# Patient Record
Sex: FEMALE | Race: WHITE | NOT HISPANIC OR LATINO | URBAN - METROPOLITAN AREA
[De-identification: names, ages, dates, MRNs, and addresses within clinical notes are randomized per-mention and may not be internally consistent; named-entity substitution may affect disease eponyms.]

---

## 2017-04-10 ENCOUNTER — INPATIENT (INPATIENT)
Facility: HOSPITAL | Age: 27
LOS: 2 days | Discharge: HOME | End: 2017-04-13
Payer: COMMERCIAL

## 2017-04-10 VITALS — HEIGHT: 60 IN | WEIGHT: 158.73 LBS

## 2017-04-10 DIAGNOSIS — O47.1 FALSE LABOR AT OR AFTER 37 COMPLETED WEEKS OF GESTATION: ICD-10-CM

## 2017-04-10 LAB
APPEARANCE UR: CLEAR — SIGNIFICANT CHANGE UP
BASOPHILS # BLD AUTO: 0 K/UL — SIGNIFICANT CHANGE UP (ref 0–0.2)
BASOPHILS NFR BLD AUTO: 0.3 % — SIGNIFICANT CHANGE UP (ref 0–2)
BILIRUB UR-MCNC: NEGATIVE — SIGNIFICANT CHANGE UP
BLD GP AB SCN SERPL QL: SIGNIFICANT CHANGE UP
COLOR SPEC: SIGNIFICANT CHANGE UP
DIFF PNL FLD: NEGATIVE — SIGNIFICANT CHANGE UP
EOSINOPHIL # BLD AUTO: 0.3 K/UL — SIGNIFICANT CHANGE UP (ref 0–0.5)
EOSINOPHIL NFR BLD AUTO: 3.2 % — SIGNIFICANT CHANGE UP (ref 0–6)
GLUCOSE UR QL: NEGATIVE MG/DL — SIGNIFICANT CHANGE UP
HCT VFR BLD CALC: 33.9 % — LOW (ref 37–47)
HGB BLD-MCNC: 12 G/DL — SIGNIFICANT CHANGE UP (ref 12–16)
KETONES UR-MCNC: NEGATIVE — SIGNIFICANT CHANGE UP
LEUKOCYTE ESTERASE UR-ACNC: NEGATIVE — SIGNIFICANT CHANGE UP
LYMPHOCYTES # BLD AUTO: 2.7 K/UL — SIGNIFICANT CHANGE UP (ref 1–4.8)
LYMPHOCYTES # BLD AUTO: 27.8 % — SIGNIFICANT CHANGE UP (ref 20–55)
MCHC RBC-ENTMCNC: 31.6 PG — HIGH (ref 27–31)
MCHC RBC-ENTMCNC: 35.4 G/DL — SIGNIFICANT CHANGE UP (ref 32–36)
MCV RBC AUTO: 89.2 FL — SIGNIFICANT CHANGE UP (ref 81–99)
MONOCYTES # BLD AUTO: 1.1 K/UL — HIGH (ref 0–0.8)
MONOCYTES NFR BLD AUTO: 11.2 % — HIGH (ref 3–10)
NEUTROPHILS # BLD AUTO: 5.6 K/UL — SIGNIFICANT CHANGE UP (ref 1.8–8)
NEUTROPHILS NFR BLD AUTO: 56.5 % — SIGNIFICANT CHANGE UP (ref 37–73)
NITRITE UR-MCNC: NEGATIVE — SIGNIFICANT CHANGE UP
PH UR: 7 — SIGNIFICANT CHANGE UP (ref 4.8–8)
PLATELET # BLD AUTO: 223 K/UL — SIGNIFICANT CHANGE UP (ref 150–400)
PROT UR-MCNC: NEGATIVE MG/DL — SIGNIFICANT CHANGE UP
RBC # BLD: 3.8 M/UL — LOW (ref 4.4–5.2)
RBC # FLD: 13.7 % — SIGNIFICANT CHANGE UP (ref 11–15.6)
SP GR SPEC: 1 — LOW (ref 1.01–1.02)
TYPE + AB SCN PNL BLD: SIGNIFICANT CHANGE UP
UROBILINOGEN FLD QL: NEGATIVE MG/DL — SIGNIFICANT CHANGE UP
WBC # BLD: 9.9 K/UL — SIGNIFICANT CHANGE UP (ref 4.8–10.8)
WBC # FLD AUTO: 9.9 K/UL — SIGNIFICANT CHANGE UP (ref 4.8–10.8)

## 2017-04-10 RX ORDER — SODIUM CHLORIDE 9 MG/ML
1000 INJECTION, SOLUTION INTRAVENOUS
Qty: 0 | Refills: 0 | Status: DISCONTINUED | OUTPATIENT
Start: 2017-04-10 | End: 2017-04-11

## 2017-04-10 RX ORDER — CITRIC ACID/SODIUM CITRATE 300-500 MG
30 SOLUTION, ORAL ORAL ONCE
Qty: 0 | Refills: 0 | Status: COMPLETED | OUTPATIENT
Start: 2017-04-10 | End: 2017-04-10

## 2017-04-10 RX ORDER — OXYTOCIN 10 UNIT/ML
333.33 VIAL (ML) INJECTION
Qty: 20 | Refills: 0 | Status: COMPLETED | OUTPATIENT
Start: 2017-04-10

## 2017-04-10 RX ORDER — MEPERIDINE HYDROCHLORIDE 50 MG/ML
75 INJECTION INTRAMUSCULAR; INTRAVENOUS; SUBCUTANEOUS EVERY 4 HOURS
Qty: 0 | Refills: 0 | Status: DISCONTINUED | OUTPATIENT
Start: 2017-04-10 | End: 2017-04-11

## 2017-04-10 RX ORDER — SODIUM CHLORIDE 9 MG/ML
1500 INJECTION, SOLUTION INTRAVENOUS ONCE
Qty: 0 | Refills: 0 | Status: COMPLETED | OUTPATIENT
Start: 2017-04-10 | End: 2017-04-11

## 2017-04-10 RX ADMIN — SODIUM CHLORIDE 125 MILLILITER(S): 9 INJECTION, SOLUTION INTRAVENOUS at 22:40

## 2017-04-10 NOTE — PATIENT PROFILE OB - VISION (WITH CORRECTIVE LENSES IF THE PATIENT USUALLY WEARS THEM):
Normal vision: sees adequately in most situations; can see medication labels, newsprint Normal vision: sees adequately in most situations; can see medication labels, newsprint/n/a

## 2017-04-10 NOTE — PATIENT PROFILE OB - ABILITY TO HEAR (WITH HEARING AID OR HEARING APPLIANCE IF NORMALLY USED):
Adequate: hears normal conversation without difficulty Adequate: hears normal conversation without difficulty/n/a

## 2017-04-11 LAB
ABO RH CONFIRMATION: SIGNIFICANT CHANGE UP
BASE EXCESS BLDCOA CALC-SCNC: -11.3 MMOL/L — SIGNIFICANT CHANGE UP (ref -11.6–0.4)
BASE EXCESS BLDCOV CALC-SCNC: -9.5 MMOL/L — LOW (ref -9.3–0.3)
GAS PNL BLDCOV: 7.22 — SIGNIFICANT CHANGE UP (ref 7.11–7.36)
HCO3 BLDCOA-SCNC: 17 MMOL/L — SIGNIFICANT CHANGE UP (ref 15–27)
HCO3 BLDCOV-SCNC: 18 MMOL/L — SIGNIFICANT CHANGE UP (ref 17–25)
PCO2 BLDCOA: 51.8 MMHG — SIGNIFICANT CHANGE UP (ref 32.2–65.8)
PCO2 BLDCOV: 42.7 MMHG — SIGNIFICANT CHANGE UP (ref 27–49.4)
PH BLDCOA: 7.14 — SIGNIFICANT CHANGE UP (ref 7.11–7.36)
PO2 BLDCOA: 23.8 MMHG — SIGNIFICANT CHANGE UP (ref 6–30)
PO2 BLDCOA: 33 MMHG — SIGNIFICANT CHANGE UP (ref 17.4–41)
RPR SERPL-ACNC: SIGNIFICANT CHANGE UP
SAO2 % BLDCOA: SIGNIFICANT CHANGE UP
SAO2 % BLDCOV: SIGNIFICANT CHANGE UP

## 2017-04-11 RX ORDER — ACETAMINOPHEN 500 MG
650 TABLET ORAL EVERY 6 HOURS
Qty: 0 | Refills: 0 | Status: DISCONTINUED | OUTPATIENT
Start: 2017-04-11 | End: 2017-04-13

## 2017-04-11 RX ORDER — LANOLIN
1 OINTMENT (GRAM) TOPICAL EVERY 6 HOURS
Qty: 0 | Refills: 0 | Status: DISCONTINUED | OUTPATIENT
Start: 2017-04-11 | End: 2017-04-13

## 2017-04-11 RX ORDER — IBUPROFEN 200 MG
600 TABLET ORAL EVERY 6 HOURS
Qty: 0 | Refills: 0 | Status: DISCONTINUED | OUTPATIENT
Start: 2017-04-11 | End: 2017-04-13

## 2017-04-11 RX ORDER — OXYTOCIN 10 UNIT/ML
333.33 VIAL (ML) INJECTION
Qty: 20 | Refills: 0 | Status: COMPLETED | OUTPATIENT
Start: 2017-04-11 | End: 2017-04-11

## 2017-04-11 RX ORDER — OXYTOCIN 10 UNIT/ML
2 VIAL (ML) INJECTION
Qty: 30 | Refills: 0 | Status: DISCONTINUED | OUTPATIENT
Start: 2017-04-11 | End: 2017-04-13

## 2017-04-11 RX ORDER — SODIUM CHLORIDE 9 MG/ML
3 INJECTION INTRAMUSCULAR; INTRAVENOUS; SUBCUTANEOUS EVERY 8 HOURS
Qty: 0 | Refills: 0 | Status: DISCONTINUED | OUTPATIENT
Start: 2017-04-11 | End: 2017-04-13

## 2017-04-11 RX ORDER — OXYTOCIN 10 UNIT/ML
41.67 VIAL (ML) INJECTION
Qty: 20 | Refills: 0 | Status: DISCONTINUED | OUTPATIENT
Start: 2017-04-11 | End: 2017-04-13

## 2017-04-11 RX ORDER — TETANUS TOXOID, REDUCED DIPHTHERIA TOXOID AND ACELLULAR PERTUSSIS VACCINE, ADSORBED 5; 2.5; 8; 8; 2.5 [IU]/.5ML; [IU]/.5ML; UG/.5ML; UG/.5ML; UG/.5ML
0.5 SUSPENSION INTRAMUSCULAR ONCE
Qty: 0 | Refills: 0 | Status: COMPLETED | OUTPATIENT
Start: 2017-04-11 | End: 2018-03-10

## 2017-04-11 RX ORDER — DIBUCAINE 1 %
1 OINTMENT (GRAM) RECTAL EVERY 4 HOURS
Qty: 0 | Refills: 0 | Status: DISCONTINUED | OUTPATIENT
Start: 2017-04-11 | End: 2017-04-13

## 2017-04-11 RX ORDER — AER TRAVELER 0.5 G/1
1 SOLUTION RECTAL; TOPICAL EVERY 4 HOURS
Qty: 0 | Refills: 0 | Status: DISCONTINUED | OUTPATIENT
Start: 2017-04-11 | End: 2017-04-13

## 2017-04-11 RX ORDER — SIMETHICONE 80 MG/1
80 TABLET, CHEWABLE ORAL EVERY 6 HOURS
Qty: 0 | Refills: 0 | Status: DISCONTINUED | OUTPATIENT
Start: 2017-04-11 | End: 2017-04-13

## 2017-04-11 RX ORDER — DIPHENHYDRAMINE HCL 50 MG
25 CAPSULE ORAL EVERY 6 HOURS
Qty: 0 | Refills: 0 | Status: DISCONTINUED | OUTPATIENT
Start: 2017-04-11 | End: 2017-04-13

## 2017-04-11 RX ORDER — PRAMOXINE HYDROCHLORIDE 150 MG/15G
1 AEROSOL, FOAM RECTAL EVERY 4 HOURS
Qty: 0 | Refills: 0 | Status: DISCONTINUED | OUTPATIENT
Start: 2017-04-11 | End: 2017-04-13

## 2017-04-11 RX ORDER — DOCUSATE SODIUM 100 MG
100 CAPSULE ORAL
Qty: 0 | Refills: 0 | Status: DISCONTINUED | OUTPATIENT
Start: 2017-04-11 | End: 2017-04-13

## 2017-04-11 RX ORDER — HYDROCORTISONE 1 %
1 OINTMENT (GRAM) TOPICAL EVERY 4 HOURS
Qty: 0 | Refills: 0 | Status: DISCONTINUED | OUTPATIENT
Start: 2017-04-11 | End: 2017-04-13

## 2017-04-11 RX ADMIN — SODIUM CHLORIDE 3000 MILLILITER(S): 9 INJECTION, SOLUTION INTRAVENOUS at 08:00

## 2017-04-11 RX ADMIN — Medication 600 MILLIGRAM(S): at 20:25

## 2017-04-11 RX ADMIN — Medication 600 MILLIGRAM(S): at 21:25

## 2017-04-11 RX ADMIN — Medication 1000 MILLIUNIT(S)/MIN: at 16:44

## 2017-04-11 RX ADMIN — Medication 2 MILLIUNIT(S)/MIN: at 10:41

## 2017-04-11 RX ADMIN — SODIUM CHLORIDE 125 MILLILITER(S): 9 INJECTION, SOLUTION INTRAVENOUS at 05:10

## 2017-04-12 LAB
ANISOCYTOSIS BLD QL: SLIGHT — SIGNIFICANT CHANGE UP
BASOPHILS # BLD AUTO: 0 K/UL — SIGNIFICANT CHANGE UP (ref 0–0.2)
EOSINOPHIL # BLD AUTO: 0.4 K/UL — SIGNIFICANT CHANGE UP (ref 0–0.5)
EOSINOPHIL NFR BLD AUTO: 3 % — SIGNIFICANT CHANGE UP (ref 0–5)
HCT VFR BLD CALC: 29.4 % — LOW (ref 37–47)
HGB BLD-MCNC: 10.5 G/DL — LOW (ref 12–16)
LYMPHOCYTES # BLD AUTO: 17 % — LOW (ref 20–55)
LYMPHOCYTES # BLD AUTO: 2.5 K/UL — SIGNIFICANT CHANGE UP (ref 1–4.8)
MACROCYTES BLD QL: SLIGHT — SIGNIFICANT CHANGE UP
MCHC RBC-ENTMCNC: 32.4 PG — HIGH (ref 27–31)
MCHC RBC-ENTMCNC: 35.7 G/DL — SIGNIFICANT CHANGE UP (ref 32–36)
MCV RBC AUTO: 90.7 FL — SIGNIFICANT CHANGE UP (ref 81–99)
MICROCYTES BLD QL: SLIGHT — SIGNIFICANT CHANGE UP
MONOCYTES # BLD AUTO: 1.5 K/UL — HIGH (ref 0–0.8)
MONOCYTES NFR BLD AUTO: 10 % — SIGNIFICANT CHANGE UP (ref 3–10)
NEUTROPHILS # BLD AUTO: 1 K/UL — LOW (ref 1.8–8)
NEUTROPHILS NFR BLD AUTO: 67 % — SIGNIFICANT CHANGE UP (ref 37–73)
NEUTS BAND # BLD: 3 % — SIGNIFICANT CHANGE UP (ref 0–8)
PLAT MORPH BLD: NORMAL — SIGNIFICANT CHANGE UP
PLATELET # BLD AUTO: 189 K/UL — SIGNIFICANT CHANGE UP (ref 150–400)
POIKILOCYTOSIS BLD QL AUTO: SLIGHT — SIGNIFICANT CHANGE UP
POLYCHROMASIA BLD QL SMEAR: SLIGHT — SIGNIFICANT CHANGE UP
RBC # BLD: 3.24 M/UL — LOW (ref 4.4–5.2)
RBC # FLD: 13.8 % — SIGNIFICANT CHANGE UP (ref 11–15.6)
RBC BLD AUTO: ABNORMAL
WBC # BLD: 14.6 K/UL — HIGH (ref 4.8–10.8)
WBC # FLD AUTO: 14.6 K/UL — HIGH (ref 4.8–10.8)

## 2017-04-12 RX ADMIN — Medication 100 MILLIGRAM(S): at 17:02

## 2017-04-12 RX ADMIN — Medication 600 MILLIGRAM(S): at 04:05

## 2017-04-12 RX ADMIN — Medication 600 MILLIGRAM(S): at 15:12

## 2017-04-12 RX ADMIN — Medication 600 MILLIGRAM(S): at 09:12

## 2017-04-12 RX ADMIN — Medication 600 MILLIGRAM(S): at 03:05

## 2017-04-12 RX ADMIN — Medication 100 MILLIGRAM(S): at 06:10

## 2017-04-12 RX ADMIN — Medication 600 MILLIGRAM(S): at 16:15

## 2017-04-12 RX ADMIN — Medication 600 MILLIGRAM(S): at 10:20

## 2017-04-12 NOTE — PROGRESS NOTE ADULT - SUBJECTIVE AND OBJECTIVE BOX
S: Patient doing well, denies complaints.  OOB, +voiding, +ambulating without difficulty.  No dizziness.  Not breast feeding.    O: Vital Signs Last 24 Hrs  T(C): 36.9, Max: 36.9 (- @ 17:46)  T(F): 98.5, Max: 98.5 (04-11 @ 17:46)  HR: 87 (77 - 97)  BP: 110/66 (110/66 - 136/63)  BP(mean): --  RR: 13 (13 - 16)  SpO2: 95% (95% - 95%)    Gen: NAD  Abd: soft, NT, ND, fundus firm below umbilicus  Lochia min/mod  Ext: no DVT tenderness    Labs:                        12.0   9.9   )-----------( 223      ( 10 Apr 2017 22:41 )             33.9        A: 26y PPD#1 s/p  doing well.  Will cont Motrin/Percocet prn.  Plan for D/C tomorrow am.

## 2017-04-13 VITALS
TEMPERATURE: 98 F | DIASTOLIC BLOOD PRESSURE: 66 MMHG | RESPIRATION RATE: 18 BRPM | HEART RATE: 80 BPM | SYSTOLIC BLOOD PRESSURE: 107 MMHG

## 2017-04-13 PROCEDURE — 59050 FETAL MONITOR W/REPORT: CPT

## 2017-04-13 PROCEDURE — 86901 BLOOD TYPING SEROLOGIC RH(D): CPT

## 2017-04-13 PROCEDURE — 86592 SYPHILIS TEST NON-TREP QUAL: CPT

## 2017-04-13 PROCEDURE — 82803 BLOOD GASES ANY COMBINATION: CPT

## 2017-04-13 PROCEDURE — 86850 RBC ANTIBODY SCREEN: CPT

## 2017-04-13 PROCEDURE — 81003 URINALYSIS AUTO W/O SCOPE: CPT

## 2017-04-13 PROCEDURE — 86900 BLOOD TYPING SEROLOGIC ABO: CPT

## 2017-04-13 PROCEDURE — 90707 MMR VACCINE SC: CPT

## 2017-04-13 PROCEDURE — 90715 TDAP VACCINE 7 YRS/> IM: CPT

## 2017-04-13 PROCEDURE — 85027 COMPLETE CBC AUTOMATED: CPT

## 2017-04-13 PROCEDURE — 36415 COLL VENOUS BLD VENIPUNCTURE: CPT

## 2017-04-13 RX ORDER — TETANUS TOXOID, REDUCED DIPHTHERIA TOXOID AND ACELLULAR PERTUSSIS VACCINE, ADSORBED 5; 2.5; 8; 8; 2.5 [IU]/.5ML; [IU]/.5ML; UG/.5ML; UG/.5ML; UG/.5ML
0.5 SUSPENSION INTRAMUSCULAR ONCE
Qty: 0 | Refills: 0 | Status: COMPLETED | OUTPATIENT
Start: 2017-04-13 | End: 2017-04-13

## 2017-04-13 RX ADMIN — Medication 100 MILLIGRAM(S): at 05:46

## 2017-04-13 RX ADMIN — Medication 0.5 MILLILITER(S): at 12:12

## 2017-04-13 RX ADMIN — TETANUS TOXOID, REDUCED DIPHTHERIA TOXOID AND ACELLULAR PERTUSSIS VACCINE, ADSORBED 0.5 MILLILITER(S): 5; 2.5; 8; 8; 2.5 SUSPENSION INTRAMUSCULAR at 05:46

## 2017-04-13 NOTE — DISCHARGE NOTE OB - CARE PROVIDER_API CALL
Bridgette Oliver), Obstetrics and Gynecology  [address] [place] [address]  Phone: [phone]  Fax: [phone]

## 2017-04-13 NOTE — PROGRESS NOTE ADULT - SUBJECTIVE AND OBJECTIVE BOX
Post partum Day#2        Vital Signs Last 24 Hrs  T(C): 36.8, Max: 36.8 (04-13 @ 08:47)  T(F): 98.3, Max: 98.3 (04-13 @ 08:47)  HR: 80 (69 - 80)  BP: 107/66 (106/68 - 107/66)  BP(mean): --  RR: 18 (18 - 18)  SpO2: --    Out of bed  Breasts soft  nontender no engorgement  Lungs Clear  Abdomen soft, fundus firm, lochia wnl  No cva or calf tenderness                          10.5   14.6  )-----------( 189      ( 12 Apr 2017 07:27 )             29.4               Patient doing well, discharge today to office in 6 weeks

## 2017-04-13 NOTE — DISCHARGE NOTE OB - CARE PLAN
Principal Discharge DX:	Vaginal delivery  Goal:	full recovery  Instructions for follow-up, activity and diet:	F/U in office in 6 wks, regular diet, pelvic rest

## 2017-04-13 NOTE — DISCHARGE NOTE OB - PATIENT PORTAL LINK FT
“You can access the FollowHealth Patient Portal, offered by [facility], by registering with the following website: [email or web address]”

## 2018-07-18 ENCOUNTER — RESULT REVIEW (OUTPATIENT)
Age: 28
End: 2018-07-18

## 2019-05-20 ENCOUNTER — EMERGENCY (EMERGENCY)
Facility: HOSPITAL | Age: 29
LOS: 1 days | Discharge: HOME | End: 2019-05-20
Attending: EMERGENCY MEDICINE
Payer: COMMERCIAL

## 2019-05-20 VITALS
HEIGHT: 60 IN | HEART RATE: 92 BPM | TEMPERATURE: 98 F | OXYGEN SATURATION: 98 % | SYSTOLIC BLOOD PRESSURE: 131 MMHG | WEIGHT: 113.1 LBS | DIASTOLIC BLOOD PRESSURE: 81 MMHG | RESPIRATION RATE: 20 BRPM

## 2019-05-20 LAB
ALBUMIN SERPL ELPH-MCNC: 4.6 G/DL — SIGNIFICANT CHANGE UP (ref 3.3–5.2)
ALP SERPL-CCNC: 71 U/L — SIGNIFICANT CHANGE UP (ref 40–120)
ALT FLD-CCNC: 11 U/L — SIGNIFICANT CHANGE UP
ANION GAP SERPL CALC-SCNC: 14 MMOL/L — SIGNIFICANT CHANGE UP (ref 5–17)
AST SERPL-CCNC: 18 U/L — SIGNIFICANT CHANGE UP
BILIRUB SERPL-MCNC: 0.7 MG/DL — SIGNIFICANT CHANGE UP (ref 0.4–2)
BUN SERPL-MCNC: 4 MG/DL — LOW (ref 8–20)
CALCIUM SERPL-MCNC: 9.8 MG/DL — SIGNIFICANT CHANGE UP (ref 8.6–10.2)
CHLORIDE SERPL-SCNC: 101 MMOL/L — SIGNIFICANT CHANGE UP (ref 98–107)
CO2 SERPL-SCNC: 23 MMOL/L — SIGNIFICANT CHANGE UP (ref 22–29)
CREAT SERPL-MCNC: 0.7 MG/DL — SIGNIFICANT CHANGE UP (ref 0.5–1.3)
D DIMER BLD IA.RAPID-MCNC: <150 NG/ML DDU — SIGNIFICANT CHANGE UP
GLUCOSE SERPL-MCNC: 111 MG/DL — SIGNIFICANT CHANGE UP (ref 70–115)
HCG SERPL-ACNC: <4 MIU/ML — SIGNIFICANT CHANGE UP
HCT VFR BLD CALC: 42.7 % — SIGNIFICANT CHANGE UP (ref 37–47)
HGB BLD-MCNC: 15 G/DL — SIGNIFICANT CHANGE UP (ref 12–16)
MAGNESIUM SERPL-MCNC: 1.5 MG/DL — LOW (ref 1.6–2.6)
MCHC RBC-ENTMCNC: 31.4 PG — HIGH (ref 27–31)
MCHC RBC-ENTMCNC: 35.1 G/DL — SIGNIFICANT CHANGE UP (ref 32–36)
MCV RBC AUTO: 89.5 FL — SIGNIFICANT CHANGE UP (ref 81–99)
PLATELET # BLD AUTO: 344 K/UL — SIGNIFICANT CHANGE UP (ref 150–400)
POTASSIUM SERPL-MCNC: 3.2 MMOL/L — LOW (ref 3.5–5.3)
POTASSIUM SERPL-SCNC: 3.2 MMOL/L — LOW (ref 3.5–5.3)
PROT SERPL-MCNC: 7.2 G/DL — SIGNIFICANT CHANGE UP (ref 6.6–8.7)
RBC # BLD: 4.77 M/UL — SIGNIFICANT CHANGE UP (ref 4.4–5.2)
RBC # FLD: 12.9 % — SIGNIFICANT CHANGE UP (ref 11–15.6)
SODIUM SERPL-SCNC: 138 MMOL/L — SIGNIFICANT CHANGE UP (ref 135–145)
T4 AB SER-ACNC: 6.2 UG/DL — SIGNIFICANT CHANGE UP (ref 4.5–12)
TROPONIN T SERPL-MCNC: <0.01 NG/ML — SIGNIFICANT CHANGE UP (ref 0–0.06)
TSH SERPL-MCNC: 2.24 UIU/ML — SIGNIFICANT CHANGE UP (ref 0.27–4.2)
WBC # BLD: 6.4 K/UL — SIGNIFICANT CHANGE UP (ref 4.8–10.8)
WBC # FLD AUTO: 6.4 K/UL — SIGNIFICANT CHANGE UP (ref 4.8–10.8)

## 2019-05-20 PROCEDURE — 84702 CHORIONIC GONADOTROPIN TEST: CPT

## 2019-05-20 PROCEDURE — 83735 ASSAY OF MAGNESIUM: CPT

## 2019-05-20 PROCEDURE — 96374 THER/PROPH/DIAG INJ IV PUSH: CPT

## 2019-05-20 PROCEDURE — 71046 X-RAY EXAM CHEST 2 VIEWS: CPT | Mod: 26

## 2019-05-20 PROCEDURE — 84484 ASSAY OF TROPONIN QUANT: CPT

## 2019-05-20 PROCEDURE — 80053 COMPREHEN METABOLIC PANEL: CPT

## 2019-05-20 PROCEDURE — 99285 EMERGENCY DEPT VISIT HI MDM: CPT

## 2019-05-20 PROCEDURE — 85379 FIBRIN DEGRADATION QUANT: CPT

## 2019-05-20 PROCEDURE — 36415 COLL VENOUS BLD VENIPUNCTURE: CPT

## 2019-05-20 PROCEDURE — 85027 COMPLETE CBC AUTOMATED: CPT

## 2019-05-20 PROCEDURE — 93010 ELECTROCARDIOGRAM REPORT: CPT

## 2019-05-20 PROCEDURE — 84443 ASSAY THYROID STIM HORMONE: CPT

## 2019-05-20 PROCEDURE — 71046 X-RAY EXAM CHEST 2 VIEWS: CPT

## 2019-05-20 PROCEDURE — 93005 ELECTROCARDIOGRAM TRACING: CPT

## 2019-05-20 PROCEDURE — 84436 ASSAY OF TOTAL THYROXINE: CPT

## 2019-05-20 PROCEDURE — 99285 EMERGENCY DEPT VISIT HI MDM: CPT | Mod: 25

## 2019-05-20 RX ORDER — ALPRAZOLAM 0.25 MG
0.25 TABLET ORAL ONCE
Refills: 0 | Status: DISCONTINUED | OUTPATIENT
Start: 2019-05-20 | End: 2019-05-20

## 2019-05-20 RX ORDER — MAGNESIUM SULFATE 500 MG/ML
1 VIAL (ML) INJECTION ONCE
Refills: 0 | Status: COMPLETED | OUTPATIENT
Start: 2019-05-20 | End: 2019-05-20

## 2019-05-20 RX ORDER — FLUTICASONE PROPIONATE AND SALMETEROL 50; 250 UG/1; UG/1
0 POWDER ORAL; RESPIRATORY (INHALATION)
Qty: 0 | Refills: 0 | DISCHARGE

## 2019-05-20 RX ORDER — SODIUM CHLORIDE 9 MG/ML
1000 INJECTION INTRAMUSCULAR; INTRAVENOUS; SUBCUTANEOUS ONCE
Refills: 0 | Status: COMPLETED | OUTPATIENT
Start: 2019-05-20 | End: 2019-05-20

## 2019-05-20 RX ORDER — POTASSIUM CHLORIDE 20 MEQ
40 PACKET (EA) ORAL ONCE
Refills: 0 | Status: COMPLETED | OUTPATIENT
Start: 2019-05-20 | End: 2019-05-20

## 2019-05-20 RX ORDER — ALBUTEROL 90 UG/1
0 AEROSOL, METERED ORAL
Qty: 0 | Refills: 0 | DISCHARGE

## 2019-05-20 RX ADMIN — Medication 0.25 MILLIGRAM(S): at 13:31

## 2019-05-20 RX ADMIN — Medication 40 MILLIEQUIVALENT(S): at 15:45

## 2019-05-20 RX ADMIN — SODIUM CHLORIDE 1000 MILLILITER(S): 9 INJECTION INTRAMUSCULAR; INTRAVENOUS; SUBCUTANEOUS at 15:48

## 2019-05-20 RX ADMIN — Medication 100 GRAM(S): at 15:45

## 2019-05-20 NOTE — ED PROVIDER NOTE - CARE PLAN
Principal Discharge DX:	Electrolyte disturbance  Secondary Diagnosis:	Anxiety Principal Discharge DX:	Panic attack  Secondary Diagnosis:	Anxiety  Secondary Diagnosis:	Hypokalemia  Secondary Diagnosis:	Hypomagnesemia

## 2019-05-20 NOTE — ED ADULT TRIAGE NOTE - CHIEF COMPLAINT QUOTE
pt c/o tingling to B/l hands with tightening. Pt states to have felt onset while driving to work this morning with SOB and hyperventilation. Pt aox4, resp even unlabored, report to feel tingling to B/L hands at this time. no PMH, or new medication. Pt states to be getting  in a few weeks.

## 2019-05-20 NOTE — ED PROVIDER NOTE - PHYSICAL EXAMINATION
+tachypneic, +hyperventilating, + anxious, +restless, +stiffness noted to BL hands. fingers, pt has full ROM in all extremities, strength 5/5 and  equal in BL upper and lower extremities

## 2019-05-20 NOTE — ED PROVIDER NOTE - CLINICAL SUMMARY MEDICAL DECISION MAKING FREE TEXT BOX
28 y/o F with hx bulimia presents with numbness and tingling to hands and legs accompanied by chest pain and SOB, EKG sinus arrythmia, d-dimer and troponin negative, hypokalemia and hypomagnesia noted, potassium and magnesium supplemented, pt reports improvement, d/c home with follow up with PCP

## 2019-05-20 NOTE — ED PROVIDER NOTE - OBJECTIVE STATEMENT
28 y/o female presents to the ED with c/o numbness and tinglings to hands and feet accompanied by chest pain/ tightness and SOB. Pt states she noticed tingling to her arms last night which improved on its own and then began again while driving today.  Patient reports in the ambulance tingling and tightness was at its worst and now reports her fingers are stiff. Pt is extremely anxious and hyperventilating. When asked patient can open and close hands and has full ROM but while sitting in stretcher her fingers are flexed and stiff. Denies headache and dizziness. Reports being under stress as she is planning her wedding.  She states she has not eaten anything yet today and admits to being bulimic since college but does not want her parents to know who are at bedside.

## 2019-05-20 NOTE — ED PROVIDER NOTE - NS ED ROS FT
+ chest tightness, +tingling and tightness to BL hands and legs, +stiffness to BL hands/ fingers , +SOB,

## 2019-05-20 NOTE — ED ADULT NURSE NOTE - OBJECTIVE STATEMENT
Patient AOX4, reliable historian, reports muscle cramping in hands and legs that started last night and increased in severity during the day today. Patient reports feeling very anxious and "overall not well". Patient reports getting  in 3 weeks and feels overwhelmed. Reports dizziness as well. Denies chest pain, n/v, fevers, chills, cough.

## 2019-05-20 NOTE — ED PROVIDER NOTE - ATTENDING CONTRIBUTION TO CARE
I, Yomi Oliver, performed a face to face bedside interview with this patient regarding history of present illness, review of symptoms and relevant past medical, social and family history.  I completed an independent physical examination. I have communicated the patient’s plan of care and disposition with the ACP.  29 year old female presents with SOB. Pt states that she was driving today when she immediately began to feel very anxious, her heart was racing, and she felt like she could not catch her breath. She states she was breathing very fast and afterwards her hands started to cramp. Upon initial eval, pt found to be hyperventilating, palced on non-rebreather, and it shortly abated  Gen: NAD, well appearing  CV: RRR  Pul: CTA b/l  Abd: Soft, non-distended, non-tender  Neuro: no focal deficits  Pt improved, states she feels better, neuro inatct, liekly panic attack, stable for dc

## 2019-05-20 NOTE — ED PROVIDER NOTE - PROGRESS NOTE DETAILS
Patient resting comfortably, eating crackers and drinking water. Reports feeling better but still with some tingling to BL hands and lower extremities. Fiance at bedside. Discussed with patient importance of balanced diet, hydration, and to stop using laxatives. Social work and psychiatry consults offered to patient but refused. Patient reports that she has a therapist outpatient and will follow up with her. Patient agrees to discharge plan

## 2019-05-21 PROBLEM — J45.909 UNSPECIFIED ASTHMA, UNCOMPLICATED: Chronic | Status: ACTIVE | Noted: 2019-05-20

## 2019-05-21 PROBLEM — Z00.00 ENCOUNTER FOR PREVENTIVE HEALTH EXAMINATION: Status: ACTIVE | Noted: 2019-05-21

## 2019-05-21 PROBLEM — F50.2 BULIMIA NERVOSA: Chronic | Status: ACTIVE | Noted: 2019-05-20

## 2019-06-04 ENCOUNTER — APPOINTMENT (OUTPATIENT)
Dept: FAMILY MEDICINE | Facility: CLINIC | Age: 29
End: 2019-06-04

## 2019-12-11 ENCOUNTER — RESULT REVIEW (OUTPATIENT)
Age: 29
End: 2019-12-11

## 2021-01-06 ENCOUNTER — RESULT REVIEW (OUTPATIENT)
Age: 31
End: 2021-01-06

## 2021-09-19 ENCOUNTER — EMERGENCY (EMERGENCY)
Facility: HOSPITAL | Age: 31
LOS: 1 days | Discharge: HOME | End: 2021-09-19
Attending: EMERGENCY MEDICINE
Payer: COMMERCIAL

## 2021-09-19 VITALS
DIASTOLIC BLOOD PRESSURE: 87 MMHG | OXYGEN SATURATION: 97 % | SYSTOLIC BLOOD PRESSURE: 129 MMHG | WEIGHT: 110.01 LBS | RESPIRATION RATE: 18 BRPM | TEMPERATURE: 99 F | HEIGHT: 60 IN | HEART RATE: 106 BPM

## 2021-09-19 VITALS — HEART RATE: 82 BPM

## 2021-09-19 PROCEDURE — 90471 IMMUNIZATION ADMIN: CPT

## 2021-09-19 PROCEDURE — 90715 TDAP VACCINE 7 YRS/> IM: CPT

## 2021-09-19 PROCEDURE — 73130 X-RAY EXAM OF HAND: CPT

## 2021-09-19 PROCEDURE — 12032 INTMD RPR S/A/T/EXT 2.6-7.5: CPT

## 2021-09-19 PROCEDURE — 99283 EMERGENCY DEPT VISIT LOW MDM: CPT | Mod: 25

## 2021-09-19 PROCEDURE — 12004 RPR S/N/AX/GEN/TRK7.6-12.5CM: CPT

## 2021-09-19 PROCEDURE — 73130 X-RAY EXAM OF HAND: CPT | Mod: 26,LT

## 2021-09-19 PROCEDURE — 99284 EMERGENCY DEPT VISIT MOD MDM: CPT | Mod: 25

## 2021-09-19 RX ORDER — ACETAMINOPHEN 500 MG
650 TABLET ORAL ONCE
Refills: 0 | Status: COMPLETED | OUTPATIENT
Start: 2021-09-19 | End: 2021-09-19

## 2021-09-19 RX ORDER — SACCHAROMYCES BOULARDII 250 MG
1 POWDER IN PACKET (EA) ORAL
Qty: 28 | Refills: 0
Start: 2021-09-19 | End: 2021-10-02

## 2021-09-19 RX ORDER — TETANUS TOXOID, REDUCED DIPHTHERIA TOXOID AND ACELLULAR PERTUSSIS VACCINE, ADSORBED 5; 2.5; 8; 8; 2.5 [IU]/.5ML; [IU]/.5ML; UG/.5ML; UG/.5ML; UG/.5ML
0.5 SUSPENSION INTRAMUSCULAR ONCE
Refills: 0 | Status: COMPLETED | OUTPATIENT
Start: 2021-09-19 | End: 2021-09-19

## 2021-09-19 RX ADMIN — TETANUS TOXOID, REDUCED DIPHTHERIA TOXOID AND ACELLULAR PERTUSSIS VACCINE, ADSORBED 0.5 MILLILITER(S): 5; 2.5; 8; 8; 2.5 SUSPENSION INTRAMUSCULAR at 20:31

## 2021-09-19 RX ADMIN — Medication 650 MILLIGRAM(S): at 20:31

## 2021-09-19 RX ADMIN — Medication 1 TABLET(S): at 20:31

## 2021-09-19 NOTE — ED PROVIDER NOTE - PROGRESS NOTE DETAILS
HUY Lew NOTE: Pt evaluated at bedside. 32 y/o F with no PMHx presents with left wrist and hand laceration occurring today while trying to break up dogs, reports dog is UTD on vaccinations. Pt last known TDAP > 10 years ago. Denies numbness, tingling.     PE: 3 cm gaping laceration to mid left ventral wrist without active bleeding; 5 cm curved jagged gaping laceration to left hypothenar eminence without active bleeding. FROM, strength intact, sensation intact, cap refill intact    Will follow up plan per physician. HUY Lew NOTE: Pt evaluated at bedside. 32 y/o F with no PMHx presents with left wrist and hand laceration occurring today while trying to break up dogs, reports dog is UTD on vaccinations. Pt last known TDAP > 10 years ago. Denies numbness, tingling.     PE: 3 cm gaping laceration to mid left ventral wrist without active bleeding; 5 cm curved jagged gaping laceration with multiple flaps to left hypothenar eminence without active bleeding, multiple abrasions. FROM, strength intact, sensation intact, cap refill intact    Will follow up plan per physician. HUY Lew NOTE: Xray reviewed, lac loosely approximated, f/u 2 days for wound check    Discussion includes results, plan, proper medication use/side effects, and return precautions. Pt advised to f/u with PMD 1-2 days and specialists discussed. Pt verbalized understanding/agreement of plan.

## 2021-09-19 NOTE — ED PROVIDER NOTE - CLINICAL SUMMARY MEDICAL DECISION MAKING FREE TEXT BOX
32 y/o F with no PMHx presents with left wrist and hand laceration occurring today while trying to break up dogs, reports dog is UTD on vaccinations.  -Will update TDAP, loosely approximate gaping lacerations, abx, bite form faxed to [facility]

## 2021-09-19 NOTE — ED PROCEDURE NOTE - CPROC ED LACER REPAIR DETAIL1
The wound was explored to base in bloodless field./No foreign body/Multiple flaps were aligned.
The wound was explored to base in bloodless field./No foreign body/Multiple flaps were aligned.

## 2021-09-19 NOTE — ED PROVIDER NOTE - PHYSICAL EXAMINATION
Skin: 2.5cm linear laceration to left wrist, 4cm curved laceration to left hand. no foreign body visualized in lacerations.

## 2021-09-19 NOTE — ED PROCEDURE NOTE - CPROC ED TIME OUT STATEMENT1
“Patient's name, , procedure and correct site were confirmed during the [place] Timeout.”
“Patient's name, , procedure and correct site were confirmed during the [place] Timeout.”

## 2021-09-19 NOTE — ED PROVIDER NOTE - ATTENDING CONTRIBUTION TO CARE
Pt. awake and alert. Pt. with laceration to left hand and left proximal forearm. Sensation intact. I, Dr. Peterson, performed a face to face bedside interview with this patient regarding history of present illness, review of symptoms and relevant past medical, social and family history.  I completed an independent physical examination.  I have also reviewed the ACP's note(s) and discussed the plan with the ACP.

## 2021-09-19 NOTE — ED PROVIDER NOTE - OBJECTIVE STATEMENT
Pt is a 31y Female with no significant PMHx who presents to ED today c/o dog bite to left hand and wrist this afternoon. Pt states she was breaking up a fight between her dog and boyfriend's dog when boyfriend's dog bit her. Pt states boyfriend's dog is up to date on vaccines. Denies fever, chills, other injuries, loss of sensation, numbness/tingling, loss of motor function.

## 2021-09-19 NOTE — ED PROVIDER NOTE - PATIENT PORTAL LINK FT
You can access the FollowMyHealth Patient Portal offered by [facility] by registering at the following website: [email or web address]. By joining [employer]’s FollowMyHealth portal, you will also be able to view your health information using other applications (apps) compatible with our system.

## 2021-09-19 NOTE — ED PROVIDER NOTE - NSFOLLOWUPINSTRUCTIONS_ED_ALL_ED_FT
- Please follow up with your Primary Care Doctor in 1 - 2 days. If you cannot follow-up with your primary care doctor please return to the Emergency Department for any urgent issues.  - Seek immediate medical care for any new, worsening or concerning signs or symptoms.   - Take medications as directed, be sure to read all instructions on packaging, Be sure to complete entire course of antibiotics as directed   - If you have difficulty following up, please call: [phone] or go to [email or web address] to obtain a [facility] doctor or specialist who takes your insurance in your area.    Feel better!       Animal Bite, Adult      Animal bite wounds can be mild or serious. It is important to get medical treatment to prevent infection. Ask your doctor if you need treatment to prevent an infection that can spread from animals to humans (rabies).      Follow these instructions at home:      Wound care    •Follow instructions from your doctor about how to take care of your wound. Make sure you:  •Wash your hands with soap and water before you change your bandage (dressing). If you cannot use soap and water, use hand .      •Change your bandage as told by your doctor.      •Leave stitches (sutures), skin glue, or skin tape (adhesive) strips in place. They may need to stay in place for 2 weeks or longer. If tape strips get loose and curl up, you may trim the loose edges. Do not remove tape strips completely unless your doctor says it is okay.      •Check your wound every day for signs of infection. Check for:  •More redness, swelling, or pain.      •More fluid or blood.      •Warmth.      •Pus or a bad smell.        Medicines     •Take or apply over-the-counter and prescription medicines only as told by your doctor.      •If you were prescribed an antibiotic, take or apply it as told by your doctor. Do not stop using the antibiotic even if your wound gets better.        General instructions      •Keep the injured area raised (elevated) above the level of your heart while you are sitting or lying down.    •If directed, put ice on the injured area.  •Put ice in a plastic bag.      •Place a towel between your skin and the bag.      •Leave the ice on for 20 minutes, 2–3 times per day.        •Keep all follow-up visits as told by your doctor. This is important.        Contact a doctor if:    •You have more redness, swelling, or pain around your wound.      •Your wound feels warm to the touch.      •You have a fever or chills.      •You have a general feeling of sickness (malaise).      •You feel sick to your stomach (nauseous).      •You throw up (vomit).      •You have pain that does not get better.        Get help right away if:    •You have a red streak going away from your wound.    •You have any of these coming from your wound:  •Non-clear fluid.      •More blood.      •Pus or a bad smell.        •You have trouble moving your injured area.      •You lose feeling (have numbness) or feel tingling anywhere on your body.        Summary    •It is important to get the right medical treatment for animal bites. Treatment can help you to not get an infection. Ask your doctor if you need treatment to prevent an infection that can spread from animals to humans (rabies).      •Check your wound every day for signs of infection, such as more redness or swelling instead of less.      •If you have a red streak going away from your wound, get medical help right away.      This information is not intended to replace advice given to you by your health care provider. Make sure you discuss any questions you have with your health care provider.

## 2022-01-07 ENCOUNTER — RESULT REVIEW (OUTPATIENT)
Age: 32
End: 2022-01-07

## 2022-03-25 NOTE — ED PROVIDER NOTE - CROS ED RESP ALL NEG
Discharge Planning    Met with pt to review physical therapy's recommendation for SNF. Pt becomes irritated when SNF mentioned. She can not give a reason on why she does not want to go to SNF. She has been to [facility] and did not relate any bad experiences that she had there. She states she wants to go home with [facility] home care and that her granddaughter Katia Dee will help her too. Phone call to Katia Dee at [phone] and message left. Phone call to son Usha Riley and left message. Physical therapy has suggested a wheelchair and discussed with patient and she wants a wheelchair with leg rests and question whether pt will be able to safely manuver in the house in it. - - -

## 2022-05-16 NOTE — DISCHARGE NOTE OB - AVOID TUB BATHING UNTIL YOUR POSTPARTUM VISIT
1. Sleep with head elevated. 2. Cool compresses to face and eyes  3. Start oral steroid tomorrow  4. Take benadryl/zyrtec every 6 hours as needed for itching  5. Go to ER with SOB, throat welling, worsening swelling of face  6. Follow up UC as needed.
Statement Selected

## 2022-10-12 ENCOUNTER — INPATIENT (INPATIENT)
Facility: HOSPITAL | Age: 32
LOS: 0 days | Discharge: HOME | End: 2022-10-13
Payer: COMMERCIAL

## 2022-10-12 VITALS
RESPIRATION RATE: 16 BRPM | HEART RATE: 73 BPM | TEMPERATURE: 98 F | SYSTOLIC BLOOD PRESSURE: 109 MMHG | DIASTOLIC BLOOD PRESSURE: 76 MMHG

## 2022-10-12 LAB
ABO RH CONFIRMATION: SIGNIFICANT CHANGE UP
ALBUMIN SERPL ELPH-MCNC: 3.6 G/DL — SIGNIFICANT CHANGE UP (ref 3.3–5.2)
ALP SERPL-CCNC: 136 U/L — HIGH (ref 40–120)
ALT FLD-CCNC: 27 U/L — SIGNIFICANT CHANGE UP
ANION GAP SERPL CALC-SCNC: 12 MMOL/L — SIGNIFICANT CHANGE UP (ref 5–17)
APPEARANCE UR: CLEAR — SIGNIFICANT CHANGE UP
APTT BLD: 25.3 SEC — LOW (ref 27.5–35.5)
AST SERPL-CCNC: 27 U/L — SIGNIFICANT CHANGE UP
BACTERIA # UR AUTO: ABNORMAL
BASOPHILS # BLD AUTO: 0.04 K/UL — SIGNIFICANT CHANGE UP (ref 0–0.2)
BASOPHILS NFR BLD AUTO: 0.5 % — SIGNIFICANT CHANGE UP (ref 0–2)
BILIRUB SERPL-MCNC: 0.2 MG/DL — LOW (ref 0.4–2)
BILIRUB UR-MCNC: NEGATIVE — SIGNIFICANT CHANGE UP
BLD GP AB SCN SERPL QL: SIGNIFICANT CHANGE UP
BUN SERPL-MCNC: 7.7 MG/DL — LOW (ref 8–20)
CALCIUM SERPL-MCNC: 9.6 MG/DL — SIGNIFICANT CHANGE UP (ref 8.4–10.5)
CHLORIDE SERPL-SCNC: 101 MMOL/L — SIGNIFICANT CHANGE UP (ref 98–107)
CO2 SERPL-SCNC: 20 MMOL/L — LOW (ref 22–29)
COLOR SPEC: YELLOW — SIGNIFICANT CHANGE UP
CREAT ?TM UR-MCNC: 20 MG/DL — SIGNIFICANT CHANGE UP
CREAT SERPL-MCNC: 0.63 MG/DL — SIGNIFICANT CHANGE UP (ref 0.5–1.3)
DIFF PNL FLD: ABNORMAL
EGFR: 122 ML/MIN/1.73M2 — SIGNIFICANT CHANGE UP
EOSINOPHIL # BLD AUTO: 0.19 K/UL — SIGNIFICANT CHANGE UP (ref 0–0.5)
EOSINOPHIL NFR BLD AUTO: 2.4 % — SIGNIFICANT CHANGE UP (ref 0–6)
EPI CELLS # UR: SIGNIFICANT CHANGE UP
FIBRINOGEN PPP-MCNC: 622 MG/DL — HIGH (ref 330–520)
GLUCOSE SERPL-MCNC: 65 MG/DL — LOW (ref 70–99)
GLUCOSE UR QL: NEGATIVE MG/DL — SIGNIFICANT CHANGE UP
HCT VFR BLD CALC: 35.1 % — SIGNIFICANT CHANGE UP (ref 34.5–45)
HGB BLD-MCNC: 12.1 G/DL — SIGNIFICANT CHANGE UP (ref 11.5–15.5)
HIV 1 & 2 AB SERPL IA.RAPID: SIGNIFICANT CHANGE UP
HIV 1+2 AB+HIV1 P24 AG SERPL QL IA: SIGNIFICANT CHANGE UP
IMM GRANULOCYTES NFR BLD AUTO: 1.4 % — HIGH (ref 0–0.9)
INR BLD: 0.88 RATIO — SIGNIFICANT CHANGE UP (ref 0.88–1.16)
KETONES UR-MCNC: NEGATIVE — SIGNIFICANT CHANGE UP
LDH SERPL L TO P-CCNC: 237 U/L — HIGH (ref 98–192)
LEUKOCYTE ESTERASE UR-ACNC: ABNORMAL
LYMPHOCYTES # BLD AUTO: 2.68 K/UL — SIGNIFICANT CHANGE UP (ref 1–3.3)
LYMPHOCYTES # BLD AUTO: 33.5 % — SIGNIFICANT CHANGE UP (ref 13–44)
MCHC RBC-ENTMCNC: 31.1 PG — SIGNIFICANT CHANGE UP (ref 27–34)
MCHC RBC-ENTMCNC: 34.5 GM/DL — SIGNIFICANT CHANGE UP (ref 32–36)
MCV RBC AUTO: 90.2 FL — SIGNIFICANT CHANGE UP (ref 80–100)
MONOCYTES # BLD AUTO: 0.77 K/UL — SIGNIFICANT CHANGE UP (ref 0–0.9)
MONOCYTES NFR BLD AUTO: 9.6 % — SIGNIFICANT CHANGE UP (ref 2–14)
NEUTROPHILS # BLD AUTO: 4.21 K/UL — SIGNIFICANT CHANGE UP (ref 1.8–7.4)
NEUTROPHILS NFR BLD AUTO: 52.6 % — SIGNIFICANT CHANGE UP (ref 43–77)
NITRITE UR-MCNC: NEGATIVE — SIGNIFICANT CHANGE UP
PH UR: 7 — SIGNIFICANT CHANGE UP (ref 5–8)
PLATELET # BLD AUTO: 224 K/UL — SIGNIFICANT CHANGE UP (ref 150–400)
POTASSIUM SERPL-MCNC: 3.6 MMOL/L — SIGNIFICANT CHANGE UP (ref 3.5–5.3)
POTASSIUM SERPL-SCNC: 3.6 MMOL/L — SIGNIFICANT CHANGE UP (ref 3.5–5.3)
PROT ?TM UR-MCNC: 41 MG/DL — HIGH (ref 0–12)
PROT SERPL-MCNC: 6.2 G/DL — LOW (ref 6.6–8.7)
PROT UR-MCNC: NEGATIVE — SIGNIFICANT CHANGE UP
PROT/CREAT UR-RTO: 2 RATIO — HIGH
PROTHROM AB SERPL-ACNC: 10.2 SEC — LOW (ref 10.5–13.4)
RBC # BLD: 3.89 M/UL — SIGNIFICANT CHANGE UP (ref 3.8–5.2)
RBC # FLD: 13.2 % — SIGNIFICANT CHANGE UP (ref 10.3–14.5)
RBC CASTS # UR COMP ASSIST: >50 /HPF (ref 0–4)
SARS-COV-2 RNA SPEC QL NAA+PROBE: DETECTED
SODIUM SERPL-SCNC: 132 MMOL/L — LOW (ref 135–145)
SP GR SPEC: 1 — LOW (ref 1.01–1.02)
T PALLIDUM AB TITR SER: NEGATIVE — SIGNIFICANT CHANGE UP
URATE SERPL-MCNC: 6.2 MG/DL — HIGH (ref 2.4–5.7)
UROBILINOGEN FLD QL: NEGATIVE MG/DL — SIGNIFICANT CHANGE UP
WBC # BLD: 8 K/UL — SIGNIFICANT CHANGE UP (ref 3.8–10.5)
WBC # FLD AUTO: 8 K/UL — SIGNIFICANT CHANGE UP (ref 3.8–10.5)
WBC UR QL: SIGNIFICANT CHANGE UP /HPF (ref 0–5)

## 2022-10-12 RX ORDER — ACETAMINOPHEN 500 MG
975 TABLET ORAL
Refills: 0 | Status: DISCONTINUED | OUTPATIENT
Start: 2022-10-12 | End: 2022-10-13

## 2022-10-12 RX ORDER — PRAMOXINE HYDROCHLORIDE 150 MG/15G
1 AEROSOL, FOAM RECTAL EVERY 4 HOURS
Refills: 0 | Status: DISCONTINUED | OUTPATIENT
Start: 2022-10-12 | End: 2022-10-13

## 2022-10-12 RX ORDER — SODIUM CHLORIDE 9 MG/ML
3 INJECTION INTRAMUSCULAR; INTRAVENOUS; SUBCUTANEOUS EVERY 8 HOURS
Refills: 0 | Status: DISCONTINUED | OUTPATIENT
Start: 2022-10-12 | End: 2022-10-13

## 2022-10-12 RX ORDER — AER TRAVELER 0.5 G/1
1 SOLUTION RECTAL; TOPICAL EVERY 4 HOURS
Refills: 0 | Status: DISCONTINUED | OUTPATIENT
Start: 2022-10-12 | End: 2022-10-13

## 2022-10-12 RX ORDER — LANOLIN
1 OINTMENT (GRAM) TOPICAL EVERY 6 HOURS
Refills: 0 | Status: DISCONTINUED | OUTPATIENT
Start: 2022-10-12 | End: 2022-10-13

## 2022-10-12 RX ORDER — DIBUCAINE 1 %
1 OINTMENT (GRAM) RECTAL EVERY 6 HOURS
Refills: 0 | Status: DISCONTINUED | OUTPATIENT
Start: 2022-10-12 | End: 2022-10-13

## 2022-10-12 RX ORDER — OXYTOCIN 10 UNIT/ML
333.33 VIAL (ML) INJECTION
Qty: 20 | Refills: 0 | Status: DISCONTINUED | OUTPATIENT
Start: 2022-10-12 | End: 2022-10-13

## 2022-10-12 RX ORDER — INFLUENZA VIRUS VACCINE 15; 15; 15; 15 UG/.5ML; UG/.5ML; UG/.5ML; UG/.5ML
0.5 SUSPENSION INTRAMUSCULAR ONCE
Refills: 0 | Status: DISCONTINUED | OUTPATIENT
Start: 2022-10-12 | End: 2022-10-13

## 2022-10-12 RX ORDER — OXYCODONE HYDROCHLORIDE 5 MG/1
5 TABLET ORAL
Refills: 0 | Status: DISCONTINUED | OUTPATIENT
Start: 2022-10-12 | End: 2022-10-13

## 2022-10-12 RX ORDER — IBUPROFEN 200 MG
600 TABLET ORAL EVERY 6 HOURS
Refills: 0 | Status: DISCONTINUED | OUTPATIENT
Start: 2022-10-12 | End: 2022-10-13

## 2022-10-12 RX ORDER — HYDROCORTISONE 1 %
1 OINTMENT (GRAM) TOPICAL EVERY 6 HOURS
Refills: 0 | Status: DISCONTINUED | OUTPATIENT
Start: 2022-10-12 | End: 2022-10-13

## 2022-10-12 RX ORDER — DIPHENHYDRAMINE HCL 50 MG
25 CAPSULE ORAL EVERY 6 HOURS
Refills: 0 | Status: DISCONTINUED | OUTPATIENT
Start: 2022-10-12 | End: 2022-10-13

## 2022-10-12 RX ORDER — CHLORHEXIDINE GLUCONATE 213 G/1000ML
1 SOLUTION TOPICAL ONCE
Refills: 0 | Status: DISCONTINUED | OUTPATIENT
Start: 2022-10-12 | End: 2022-10-12

## 2022-10-12 RX ORDER — BENZOCAINE 10 %
1 GEL (GRAM) MUCOUS MEMBRANE EVERY 6 HOURS
Refills: 0 | Status: DISCONTINUED | OUTPATIENT
Start: 2022-10-12 | End: 2022-10-13

## 2022-10-12 RX ORDER — OXYCODONE HYDROCHLORIDE 5 MG/1
5 TABLET ORAL ONCE
Refills: 0 | Status: DISCONTINUED | OUTPATIENT
Start: 2022-10-12 | End: 2022-10-13

## 2022-10-12 RX ORDER — SIMETHICONE 80 MG/1
80 TABLET, CHEWABLE ORAL EVERY 4 HOURS
Refills: 0 | Status: DISCONTINUED | OUTPATIENT
Start: 2022-10-12 | End: 2022-10-13

## 2022-10-12 RX ORDER — TETANUS TOXOID, REDUCED DIPHTHERIA TOXOID AND ACELLULAR PERTUSSIS VACCINE, ADSORBED 5; 2.5; 8; 8; 2.5 [IU]/.5ML; [IU]/.5ML; UG/.5ML; UG/.5ML; UG/.5ML
0.5 SUSPENSION INTRAMUSCULAR ONCE
Refills: 0 | Status: DISCONTINUED | OUTPATIENT
Start: 2022-10-12 | End: 2022-10-13

## 2022-10-12 RX ORDER — OXYTOCIN 10 UNIT/ML
333.33 VIAL (ML) INJECTION
Qty: 20 | Refills: 0 | Status: COMPLETED | OUTPATIENT
Start: 2022-10-12 | End: 2022-10-12

## 2022-10-12 RX ORDER — KETOROLAC TROMETHAMINE 30 MG/ML
30 SYRINGE (ML) INJECTION ONCE
Refills: 0 | Status: DISCONTINUED | OUTPATIENT
Start: 2022-10-12 | End: 2022-10-12

## 2022-10-12 RX ORDER — SODIUM CHLORIDE 9 MG/ML
1000 INJECTION, SOLUTION INTRAVENOUS
Refills: 0 | Status: DISCONTINUED | OUTPATIENT
Start: 2022-10-12 | End: 2022-10-12

## 2022-10-12 RX ORDER — MAGNESIUM HYDROXIDE 400 MG/1
30 TABLET, CHEWABLE ORAL
Refills: 0 | Status: DISCONTINUED | OUTPATIENT
Start: 2022-10-12 | End: 2022-10-13

## 2022-10-12 RX ORDER — CITRIC ACID/SODIUM CITRATE 300-500 MG
30 SOLUTION, ORAL ORAL ONCE
Refills: 0 | Status: DISCONTINUED | OUTPATIENT
Start: 2022-10-12 | End: 2022-10-12

## 2022-10-12 RX ORDER — IBUPROFEN 200 MG
600 TABLET ORAL EVERY 6 HOURS
Refills: 0 | Status: COMPLETED | OUTPATIENT
Start: 2022-10-12 | End: 2023-09-10

## 2022-10-12 RX ORDER — OXYTOCIN 10 UNIT/ML
2 VIAL (ML) INJECTION
Qty: 30 | Refills: 0 | Status: DISCONTINUED | OUTPATIENT
Start: 2022-10-12 | End: 2022-10-13

## 2022-10-12 RX ADMIN — Medication 975 MILLIGRAM(S): at 21:32

## 2022-10-12 RX ADMIN — Medication 975 MILLIGRAM(S): at 15:42

## 2022-10-12 RX ADMIN — Medication 30 MILLIGRAM(S): at 12:23

## 2022-10-12 RX ADMIN — SODIUM CHLORIDE 125 MILLILITER(S): 9 INJECTION, SOLUTION INTRAVENOUS at 09:50

## 2022-10-12 RX ADMIN — Medication 600 MILLIGRAM(S): at 23:29

## 2022-10-12 RX ADMIN — Medication 2 MILLIUNIT(S)/MIN: at 07:56

## 2022-10-12 RX ADMIN — Medication 600 MILLIGRAM(S): at 18:02

## 2022-10-12 RX ADMIN — Medication 1000 MILLIUNIT(S)/MIN: at 11:58

## 2022-10-12 NOTE — DISCHARGE NOTE OB - HOSPITAL COURSE
Patient is a 31yo  delivered @ 39w3d via spontaneous vaginal delivery. Delivery was uncomplicated. She was transferred from L&D to postpartum unit without complications during her stay. Upon discharge she is voiding, tolerating PO, ambulating, lochia is decreasing, labs and vitals are stable, and pain is well controlled.

## 2022-10-12 NOTE — OB PROVIDER H&P - ASSESSMENT
Patient is a 31yo  at 39w3d presenting today for induction of labor at term.  - Consent  - Admission labs  - GBS neg  - COVID pending  - Admit for IOL, will give pitocin    Plan D/w Dr. Oliver

## 2022-10-12 NOTE — DISCHARGE NOTE OB - NSDCQMSTAIRS_GEN_ALL_CORE
From: Jie Haas  To: E-Visit Provider Group  Sent: 12/25/2021 1:47 PM CST  Subject: Request an E-Visit for Urinary tract infection (UTI)    Request an E-Visit for Urinary tract infection (UTI)  --------------------------------    Question: To help us route your E-Visit to the right provider, where is your current residence?  Answer: [place]    Question: Have you seen a healthcare provider for this complaint within the last 90 days?  Answer: Yes    Question: Which of the following symptoms are you experiencing: painful urination, difficult urination, change in urine appearance or smell?  Answer: Pain while passing urine    Question: Have you had any of the following exposures in the past 14 days?  Answer: None    Question: Do you have any of the following symptoms?  Answer: None    Question: Have you traveled in the past 14 days?  Answer: No    Question: If you have pain when passing urine, which of these apply: burning sensation, pain on the inside, pain on or near the outside, or none of the above?  Answer: I have a burning sensation    Question: Are you able to pass urine?  Answer: Yes, I can pass urine.    Question: How long have you had pain or difficulty passing urine?  Answer: Two days or less    Question: Do you have a fever?  Answer: No, I do not have a fever    Question: Do you have any of the following symptoms: back pain, vomiting, diarrhea, shaking chills, or none of the above?   Answer: None of the above    Question: Do you have belly pain with this illness: pressure below the belly button, pain across the lower belly, widespread belly pain, shaking chills, or none of the above?  Answer: None of the above    Question: Do you have a sense of urgency when you need to pass urine?  Answer: Yes, I have urgency    Question: What does your urine look like?  Answer: I am not sure    Question: Do you have any of the following: blood, dark red or dark brown urine, an unusual smell, or none of the  above?  Answer: None of the above    Question: Do you have any unusual discharge from the vagina?  Answer: No    Question: Do you have any sores on your genitals?  Answer: No    Question: Have you had any kidney problems in the past?  Answer: Yes    Question: Please give some additional detail on your past kidney problems:  Answer: Kidney Stones while pregnant (the only episode).    Question: Have you been treated for a urinary/bladder infection in the past?  Answer: Yes    Question: How long ago were you treated?  Answer: Less than 4 weeks ago    Question: Within the past 3 months, have you had any surgery on your kidneys or bladder, or have you had a tube inserted to collect your urine?  Answer: No, I have not had either    Question: Have you had similar symptoms in the past?  Answer: Yes, I have had similar symptoms before    Question: If you had similar symptoms in the past, did any of the following work: antibiotics, cranberry juice, pills for yeast, cream for yeast, don't remember, or other?  Answer: Antibiotics    Question: Please provide additional detail on antibiotics or \"Other\" treatment:  Answer: n/a    Question: Have you taken antibiotics in the last 30 days?  Answer: Yes, I recently took some    Question: Please enter when you took antibiotics and the name of the medicine (if you know it).   Answer: in chart from E visit last week    Question: Do you have any chronic illnesses such as diabetes, heart disease, lung disease, or any illness that would weaken your ability to fight infection?  Answer: No    Question: Do you take any medications that can suppress the immune system (chemotherapy, steroids, transplant antirejection medications?   Answer: No    Question: Are you pregnant?  Answer: I do not know if I am pregnant    Question: Are you breast-feeding?  Answer: No    Question: Do you need a work/school excuse letter?  Answer: No    Question: Is there anything else you would like to add?  Answer:  no    Question: Thank you for completing this questionnaire. One or more of your responses may indicate that a face-to-face evaluation of these symptoms with a physician is warranted. If that is necessary, what is the preferred telephone number that we can use to contact you?  Answer: No   No

## 2022-10-12 NOTE — OB PROVIDER H&P - HISTORY OF PRESENT ILLNESS
Patient is a 31yo  at 39w3d presenting today for induction of labor at term. She denies VB, LOF, CTXs. She feels normal fetal movement    Prenatal course uncomplicated    Obhx: , 2017, FT, uncomplicated, [id number]  Medhx: Asthma (last inhaler use in 2017), Anemia  Surghx: none  Meds: PNVs  All: NKDA

## 2022-10-12 NOTE — OB RN PATIENT PROFILE - AS SC BRADEN ACTIVITY
[FreeTextEntry1] : 58 year old female with multiple complaints, including diffuse pain and swelling, diffuse tingling, weakness of her LE's, periodic "locking" of her knees or back, sensation of feeling cold, memory loss, and dizziness.  The pain/swelling is worst in her hands, shanell in her left thumb.  There is no obvious underlying connective tissue disorder to explain all her symptoms, which are more likely multifactorial.  She most likely has fibromyalgia, which would explain her diffuse pain and fatigue.  Some of her symptoms, shanell the diffuse numbness/tingling, cold sensation, and memory loss, are also suggestive of a neurologic etiology as well.  w/u for CTD's negative to date.  Bloodwork reveals elevated kappa light chains, M-spike - pt has been referred to heme who is performing further w/u.  Also suspect vascular condition.  \par   - Reiterated importance of exercise\par   - Cont Flexeril 5mg TID prn.\par   - heme f/u re: kappa LC's, M-spike\par   - Neurology f/u.\par   - vascular f/u\par   - Rx Lyrica 75mg BID.  [facility] registry consulted - Reference #: [id number] (4) walks frequently

## 2022-10-12 NOTE — OB RN PATIENT PROFILE - NS TRANSFER PATIENT BELONGINGS
Patient presents with SFD.  Patient states she took a gummy to help sleep and then felt a \"warm tingling feeling and I didn't feel myself\".  
Clothing

## 2022-10-12 NOTE — OB RN PATIENT PROFILE - FALL HARM RISK - UNIVERSAL INTERVENTIONS
Bed in lowest position, wheels locked, appropriate side rails in place/Call bell, personal items and telephone in reach/Instruct patient to call for assistance before getting out of bed or chair/Non-slip footwear when patient is out of bed/[place] to call system/Physically safe environment - no spills, clutter or unnecessary equipment/Purposeful Proactive Rounding/Room/bathroom lighting operational, light cord in reach

## 2022-10-12 NOTE — OB RN DELIVERY SUMMARY - NSSELHIDDEN_OBGYN_ALL_OB_FT
[NS_DeliveryAttending1_OBGYN_ALL_OB_FT:[id number]],[NS_DeliveryRN_OBGYN_ALL_OB_FT:[id number]=]

## 2022-10-12 NOTE — DISCHARGE NOTE OB - NS MD DC FALL RISK RISK
For information on Fall & Injury Prevention, visit: [email or web address] OR  [email or web address] OR  https://www.cdc.gov/steadi/patient.html

## 2022-10-12 NOTE — DISCHARGE NOTE OB - CARE PLAN
1 Principal Discharge DX:	Normal spontaneous vaginal delivery  Assessment and plan of treatment:	- Please call your provider to schedule a postpartum visit in 4-6 weeks. Contact information provided below.  - Prescriptions have been sent to pharmacy. Please take medications as directed.   - Patient is to continue with regular diet and activity as tolerated, nothing per vagina for 6 weeks, and exclusive breast feeding for the first 6 months is recommended.   - If you have additional concerns, or if you experience fevers > 100.4 F or heavy vaginal bleeding that is not decreasing, please inform your provider.

## 2022-10-12 NOTE — OB RN DELIVERY SUMMARY - NS_SEPSISRSKCALC_OBGYN_ALL_OB_FT
EOS calculated successfully. EOS Risk Factor: 0.5/1000 live births ([facility] national incidence); GA=39w3d; Temp=97.7; ROM=3.9; GBS='Negative'; Antibiotics='No antibiotics or any antibiotics < 2 hrs prior to birth'

## 2022-10-12 NOTE — DISCHARGE NOTE OB - MATERIALS PROVIDED
Vaccinations/[facility]  Screening Program/  Immunization Record/Bottle Feeding Log/Guide to Postpartum Care/[facility] Hearing Screen Program/Back To Sleep Handout/Shaken Baby Prevention Handout/Birth Certificate Instructions/Discharge Medication Information for Patients and Families Pocket Guide/MMR Vaccination (VIS Pub Date: 2012)/Tdap Vaccination (VIS Pub Date: 2012)

## 2022-10-12 NOTE — DISCHARGE NOTE OB - CARE PROVIDER_API CALL
Bridgette Oliver)  Obstetrics and Gynecology  [address] [place] [address]  Phone: [phone]  Fax: [phone]  Established Patient  Follow Up Time: 1 month

## 2022-10-12 NOTE — OB PROVIDER DELIVERY SUMMARY - NSPROVIDERDELIVERYNOTE_OBGYN_ALL_OB_FT
On exam at approx 11:30am, cervix FD/100% effaced.  Arellano removed and pt instructed on pushing.  After pushing approx 20min, at 11:54am head delivered FORTUNATO.  Bulb suctioned mouth at perineum.  Loosed nuchal cord x 1 manually reduced.  Body spontaneously delivered.  After delayed cord clamping for approx 40sec, cord clamped x 2 and cut.  Baby to mother, skin-to-skin initiated.  Segment of cord clamped and cut for umbilical cord blood gases.  Placenta delivered intact at 11:58am, 3 vessel cord, no gross pathology.  First degree laceration repaired with 2-0 chromic suture, excellent hemostasis.  No complications.  :  male, APGAR 9/9.  Mother stable.  EBL 96cc.  Baby later admitted to [facility].

## 2022-10-12 NOTE — OB PROVIDER H&P - NSHPPHYSICALEXAM_GEN_ALL_CORE
Vital Signs Last 24 Hrs  T(C): 36.5 (12 Oct 2022 05:41), Max: 36.5 (12 Oct 2022 05:32)  T(F): 97.7 (12 Oct 2022 05:41), Max: 97.7 (12 Oct 2022 05:32)  HR: 73 (12 Oct 2022 05:41) (73 - 73)  BP: 109/76 (12 Oct 2022 05:41) (109/76 - 109/76)  BP(mean): --  RR: 16 (12 Oct 2022 05:41) (16 - 16)  SpO2: --    Parameters below as of 12 Oct 2022 05:41  Patient On (Oxygen Delivery Method): room air    Physical Exam  General: Alert and oriented x3, NAD  Heart: RRR  Lungs: CTAB  Abd: Soft, nontender, gravid  SVE: 3/60/-2 @ 05:50  [place]: VTX  FHT: 120bpm - cat 1  [place]: Irregular ctxs

## 2022-10-12 NOTE — DISCHARGE NOTE OB - PATIENT PORTAL LINK FT
You can access the FollowMyHealth Patient Portal offered by [facility] by registering at the following website: [email or web address]. By joining [facility]’s FollowMyHealth portal, you will also be able to view your health information using other applications (apps) compatible with our system.

## 2022-10-12 NOTE — OB RN PATIENT PROFILE - NS PRO AD NO ADVANCE DIRECTIVE
allergic reaction today possibly to bee pollen in salad   total body itching and rash to palms and wrists   given 60 mg prednisone and 50 mg bendaryl PTA
No

## 2022-10-12 NOTE — DISCHARGE NOTE OB - MEDICATION SUMMARY - MEDICATIONS TO TAKE
I will START or STAY ON the medications listed below when I get home from the hospital:    ibuprofen 600 mg oral tablet  -- 1 tab(s) by mouth every 6 hours, As Needed -for mild pain   -- Do not take this drug if you are pregnant.  It is very important that you take or use this exactly as directed.  Do not skip doses or discontinue unless directed by your doctor.  May cause drowsiness or dizziness.  Obtain medical advice before taking any non-prescription drugs as some may affect the action of this medication.  Take with food or milk.    -- Indication: For pain as needed    Tylenol 325 mg oral capsule  -- 3 cap(s) by mouth every 6 hours, As Needed -for mild pain   -- Indication: For pain as needed    Prenatal Multivitamins with Folic Acid 1 mg oral tablet  -- 1 tab(s) by mouth once a day  -- Indication: For prenatal    MiraLax oral powder for reconstitution  -- 17 gram(s) by mouth once a day   -- Dilute this medication with liquid before administration.  It is very important that you take or use this exactly as directed.  Do not skip doses or discontinue unless directed by your doctor.    -- Indication: For constipation as needed

## 2022-10-13 VITALS
SYSTOLIC BLOOD PRESSURE: 103 MMHG | TEMPERATURE: 98 F | DIASTOLIC BLOOD PRESSURE: 61 MMHG | HEART RATE: 67 BPM | OXYGEN SATURATION: 98 % | RESPIRATION RATE: 18 BRPM

## 2022-10-13 LAB
HCT VFR BLD CALC: 31.8 % — LOW (ref 34.5–45)
HGB BLD-MCNC: 10.8 G/DL — LOW (ref 11.5–15.5)

## 2022-10-13 RX ORDER — IBUPROFEN 200 MG
1 TABLET ORAL
Qty: 28 | Refills: 0
Start: 2022-10-13 | End: 2022-10-19

## 2022-10-13 RX ORDER — POLYETHYLENE GLYCOL 3350 17 G/17G
17 POWDER, FOR SOLUTION ORAL
Qty: 119 | Refills: 0
Start: 2022-10-13 | End: 2022-10-19

## 2022-10-13 RX ORDER — ACETAMINOPHEN 500 MG
3 TABLET ORAL
Qty: 48 | Refills: 0
Start: 2022-10-13 | End: 2022-10-16

## 2022-10-13 RX ADMIN — Medication 1 TABLET(S): at 11:57

## 2022-10-13 RX ADMIN — Medication 975 MILLIGRAM(S): at 08:33

## 2022-10-13 RX ADMIN — Medication 975 MILLIGRAM(S): at 09:35

## 2022-10-13 RX ADMIN — Medication 975 MILLIGRAM(S): at 03:07

## 2022-10-13 RX ADMIN — Medication 600 MILLIGRAM(S): at 13:00

## 2022-10-13 RX ADMIN — Medication 600 MILLIGRAM(S): at 11:57

## 2022-10-13 RX ADMIN — Medication 600 MILLIGRAM(S): at 05:10

## 2022-10-13 NOTE — PROGRESS NOTE ADULT - ASSESSMENT
A/P:  FRIDA CARVER is a 32y  now PPD#1 s/p normal spontaneous vaginal delivery at 39 weeks 3 days gestation secondary to labor, uncomplicated delivery. She is Covid-19+ asymptomatic.    -Vital signs stable. Isolated mildly elevated BP on admission. Normotensive overnight.  -Hgb: 12.1 -> AM labs pending   -Voiding, tolerating PO, bowel function nml   -Advance care as tolerated   -Continue routine postpartum care and education  -Healthy male infant, desires circumcision  -Dispo: Pt is stable, doing well and meeting all postpartum and postoperative milestones. Possible discharge to home today pending attending approval.

## 2022-10-13 NOTE — PROGRESS NOTE ADULT - SUBJECTIVE AND OBJECTIVE BOX
FRIDA CARVER is a 32y  now PPD#1 s/p normal spontaneous vaginal delivery at 39 weeks 3 days gestation secondary to labor, uncomplicated delivery. She is Covid-19+ asymptomatic.    S:    No acute events overnight.   The patient has no complaints.  Pain controlled with current treatment regimen.   She is ambulating without difficulty and tolerating PO.   + flatus/-BM/+ voiding   She endorses appropriate lochia, which is decreasing.   She is bottle feeding.  She denies fevers, chills, nausea and vomiting.   She denies lightheadedness, dizziness, palpitations, chest pain and SOB.     O:    T(C): 36.7 (10-13-22 @ 04:12), Max: 36.8 (10-12-22 @ 15:20)  HR: 55 (10-13-22 @ 04:12) (55 - 133)  BP: 102/64 (10-13-22 @ 04:12) (94/54 - 144/71)  RR: 18 (10-13-22 @ 04:12) (16 - 18)  SpO2: 96% (10-13-22 @ 04:12) (96% - 100%)    Gen: NAD, AOx3  CV: RRR, S1/S2 present  Pulm: CTAB  Abdomen:  Soft, non-tender, non-distended, +bowel sounds  Uterus:  Fundus firm below umbilicus  Ext:  Non-tender and non-edematous                          12.1   8.00  )-----------( 224      ( 12 Oct 2022 06:15 )             35.1     10-12    132<L>  |  101  |  7.7<L>  ----------------------------<  65<L>  3.6   |  20.0<L>  |  0.63    Ca    9.6      12 Oct 2022 08:22    TPro  6.2<L>  /  Alb  3.6  /  TBili  0.2<L>  /  DBili  x   /  AST  27  /  ALT  27  /  AlkPhos  136<H>  10-12

## 2022-11-14 NOTE — ED PROVIDER NOTE - CONSTITUTIONAL APPEARANCE HYGIENE, MLM
well appearing Dorsal Nasal Flap Text: The defect edges were debeveled with a #15 scalpel blade.  Given the location of the defect and the proximity to free margins a dorsal nasal flap was deemed most appropriate.  Using a sterile surgical marker, an appropriate dorsal nasal flap was drawn around the defect.    The area thus outlined was incised deep to adipose tissue with a #15 scalpel blade.  The skin margins were undermined to an appropriate distance in all directions utilizing iris scissors.

## 2023-05-12 ENCOUNTER — NON-APPOINTMENT (OUTPATIENT)
Age: 33
End: 2023-05-12

## 2023-11-07 RX ORDER — CHLORHEXIDINE GLUCONATE 213 G/1000ML
1 SOLUTION TOPICAL DAILY
Refills: 0 | Status: DISCONTINUED | OUTPATIENT
Start: 2023-11-08 | End: 2023-11-08

## 2023-11-07 RX ORDER — CITRIC ACID/SODIUM CITRATE 300-500 MG
30 SOLUTION, ORAL ORAL ONCE
Refills: 0 | Status: DISCONTINUED | OUTPATIENT
Start: 2023-11-08 | End: 2023-11-08

## 2023-11-07 RX ORDER — SODIUM CHLORIDE 9 MG/ML
1000 INJECTION, SOLUTION INTRAVENOUS
Refills: 0 | Status: DISCONTINUED | OUTPATIENT
Start: 2023-11-08 | End: 2023-11-08

## 2023-11-07 RX ORDER — OXYTOCIN 10 UNIT/ML
333.33 VIAL (ML) INJECTION
Qty: 20 | Refills: 0 | Status: DISCONTINUED | OUTPATIENT
Start: 2023-11-08 | End: 2023-11-09

## 2023-11-08 ENCOUNTER — TRANSCRIPTION ENCOUNTER (OUTPATIENT)
Age: 33
End: 2023-11-08

## 2023-11-08 ENCOUNTER — INPATIENT (INPATIENT)
Facility: HOSPITAL | Age: 33
LOS: 0 days | Discharge: HOME | End: 2023-11-09
Payer: COMMERCIAL

## 2023-11-08 VITALS
DIASTOLIC BLOOD PRESSURE: 67 MMHG | HEIGHT: 60 IN | WEIGHT: 149.91 LBS | TEMPERATURE: 99 F | SYSTOLIC BLOOD PRESSURE: 110 MMHG | RESPIRATION RATE: 18 BRPM | HEART RATE: 82 BPM

## 2023-11-08 DIAGNOSIS — Z3A.40 40 WEEKS GESTATION OF PREGNANCY: ICD-10-CM

## 2023-11-08 LAB
BASOPHILS # BLD AUTO: 0.05 K/UL — SIGNIFICANT CHANGE UP (ref 0–0.2)
BASOPHILS # BLD AUTO: 0.05 K/UL — SIGNIFICANT CHANGE UP (ref 0–0.2)
BASOPHILS NFR BLD AUTO: 0.5 % — SIGNIFICANT CHANGE UP (ref 0–2)
BASOPHILS NFR BLD AUTO: 0.5 % — SIGNIFICANT CHANGE UP (ref 0–2)
BLD GP AB SCN SERPL QL: SIGNIFICANT CHANGE UP
BLD GP AB SCN SERPL QL: SIGNIFICANT CHANGE UP
EOSINOPHIL # BLD AUTO: 0.12 K/UL — SIGNIFICANT CHANGE UP (ref 0–0.5)
EOSINOPHIL # BLD AUTO: 0.12 K/UL — SIGNIFICANT CHANGE UP (ref 0–0.5)
EOSINOPHIL NFR BLD AUTO: 1.2 % — SIGNIFICANT CHANGE UP (ref 0–6)
EOSINOPHIL NFR BLD AUTO: 1.2 % — SIGNIFICANT CHANGE UP (ref 0–6)
HCT VFR BLD CALC: 32.1 % — LOW (ref 34.5–45)
HCT VFR BLD CALC: 32.1 % — LOW (ref 34.5–45)
HGB BLD-MCNC: 11.2 G/DL — LOW (ref 11.5–15.5)
HGB BLD-MCNC: 11.2 G/DL — LOW (ref 11.5–15.5)
IMM GRANULOCYTES NFR BLD AUTO: 0.8 % — SIGNIFICANT CHANGE UP (ref 0–0.9)
IMM GRANULOCYTES NFR BLD AUTO: 0.8 % — SIGNIFICANT CHANGE UP (ref 0–0.9)
LYMPHOCYTES # BLD AUTO: 2.16 K/UL — SIGNIFICANT CHANGE UP (ref 1–3.3)
LYMPHOCYTES # BLD AUTO: 2.16 K/UL — SIGNIFICANT CHANGE UP (ref 1–3.3)
LYMPHOCYTES # BLD AUTO: 21.4 % — SIGNIFICANT CHANGE UP (ref 13–44)
LYMPHOCYTES # BLD AUTO: 21.4 % — SIGNIFICANT CHANGE UP (ref 13–44)
MCHC RBC-ENTMCNC: 30.8 PG — SIGNIFICANT CHANGE UP (ref 27–34)
MCHC RBC-ENTMCNC: 30.8 PG — SIGNIFICANT CHANGE UP (ref 27–34)
MCHC RBC-ENTMCNC: 34.9 GM/DL — SIGNIFICANT CHANGE UP (ref 32–36)
MCHC RBC-ENTMCNC: 34.9 GM/DL — SIGNIFICANT CHANGE UP (ref 32–36)
MCV RBC AUTO: 88.2 FL — SIGNIFICANT CHANGE UP (ref 80–100)
MCV RBC AUTO: 88.2 FL — SIGNIFICANT CHANGE UP (ref 80–100)
MONOCYTES # BLD AUTO: 0.68 K/UL — SIGNIFICANT CHANGE UP (ref 0–0.9)
MONOCYTES # BLD AUTO: 0.68 K/UL — SIGNIFICANT CHANGE UP (ref 0–0.9)
MONOCYTES NFR BLD AUTO: 6.8 % — SIGNIFICANT CHANGE UP (ref 2–14)
MONOCYTES NFR BLD AUTO: 6.8 % — SIGNIFICANT CHANGE UP (ref 2–14)
NEUTROPHILS # BLD AUTO: 6.98 K/UL — SIGNIFICANT CHANGE UP (ref 1.8–7.4)
NEUTROPHILS # BLD AUTO: 6.98 K/UL — SIGNIFICANT CHANGE UP (ref 1.8–7.4)
NEUTROPHILS NFR BLD AUTO: 69.3 % — SIGNIFICANT CHANGE UP (ref 43–77)
NEUTROPHILS NFR BLD AUTO: 69.3 % — SIGNIFICANT CHANGE UP (ref 43–77)
PLATELET # BLD AUTO: 192 K/UL — SIGNIFICANT CHANGE UP (ref 150–400)
PLATELET # BLD AUTO: 192 K/UL — SIGNIFICANT CHANGE UP (ref 150–400)
RBC # BLD: 3.64 M/UL — LOW (ref 3.8–5.2)
RBC # BLD: 3.64 M/UL — LOW (ref 3.8–5.2)
RBC # FLD: 13 % — SIGNIFICANT CHANGE UP (ref 10.3–14.5)
RBC # FLD: 13 % — SIGNIFICANT CHANGE UP (ref 10.3–14.5)
T PALLIDUM AB TITR SER: NEGATIVE — SIGNIFICANT CHANGE UP
T PALLIDUM AB TITR SER: NEGATIVE — SIGNIFICANT CHANGE UP
WBC # BLD: 10.07 K/UL — SIGNIFICANT CHANGE UP (ref 3.8–10.5)
WBC # BLD: 10.07 K/UL — SIGNIFICANT CHANGE UP (ref 3.8–10.5)
WBC # FLD AUTO: 10.07 K/UL — SIGNIFICANT CHANGE UP (ref 3.8–10.5)
WBC # FLD AUTO: 10.07 K/UL — SIGNIFICANT CHANGE UP (ref 3.8–10.5)

## 2023-11-08 RX ORDER — DIBUCAINE 1 %
1 OINTMENT (GRAM) RECTAL EVERY 6 HOURS
Refills: 0 | Status: DISCONTINUED | OUTPATIENT
Start: 2023-11-08 | End: 2023-11-09

## 2023-11-08 RX ORDER — HYDROCORTISONE 1 %
1 OINTMENT (GRAM) TOPICAL EVERY 6 HOURS
Refills: 0 | Status: DISCONTINUED | OUTPATIENT
Start: 2023-11-08 | End: 2023-11-09

## 2023-11-08 RX ORDER — BENZOCAINE 10 %
1 GEL (GRAM) MUCOUS MEMBRANE EVERY 6 HOURS
Refills: 0 | Status: DISCONTINUED | OUTPATIENT
Start: 2023-11-08 | End: 2023-11-09

## 2023-11-08 RX ORDER — KETOROLAC TROMETHAMINE 30 MG/ML
30 SYRINGE (ML) INJECTION ONCE
Refills: 0 | Status: DISCONTINUED | OUTPATIENT
Start: 2023-11-08 | End: 2023-11-08

## 2023-11-08 RX ORDER — OXYTOCIN 10 UNIT/ML
41.67 VIAL (ML) INJECTION
Qty: 20 | Refills: 0 | Status: DISCONTINUED | OUTPATIENT
Start: 2023-11-08 | End: 2023-11-09

## 2023-11-08 RX ORDER — MAGNESIUM HYDROXIDE 400 MG/1
30 TABLET, CHEWABLE ORAL
Refills: 0 | Status: DISCONTINUED | OUTPATIENT
Start: 2023-11-08 | End: 2023-11-09

## 2023-11-08 RX ORDER — DIPHENHYDRAMINE HCL 50 MG
25 CAPSULE ORAL EVERY 6 HOURS
Refills: 0 | Status: DISCONTINUED | OUTPATIENT
Start: 2023-11-08 | End: 2023-11-09

## 2023-11-08 RX ORDER — LANOLIN
1 OINTMENT (GRAM) TOPICAL EVERY 6 HOURS
Refills: 0 | Status: DISCONTINUED | OUTPATIENT
Start: 2023-11-08 | End: 2023-11-09

## 2023-11-08 RX ORDER — IBUPROFEN 200 MG
600 TABLET ORAL EVERY 6 HOURS
Refills: 0 | Status: COMPLETED | OUTPATIENT
Start: 2023-11-08 | End: 2024-10-06

## 2023-11-08 RX ORDER — SIMETHICONE 80 MG/1
80 TABLET, CHEWABLE ORAL EVERY 4 HOURS
Refills: 0 | Status: DISCONTINUED | OUTPATIENT
Start: 2023-11-08 | End: 2023-11-09

## 2023-11-08 RX ORDER — ACETAMINOPHEN 500 MG
975 TABLET ORAL
Refills: 0 | Status: DISCONTINUED | OUTPATIENT
Start: 2023-11-08 | End: 2023-11-09

## 2023-11-08 RX ORDER — AER TRAVELER 0.5 G/1
1 SOLUTION RECTAL; TOPICAL EVERY 4 HOURS
Refills: 0 | Status: DISCONTINUED | OUTPATIENT
Start: 2023-11-08 | End: 2023-11-09

## 2023-11-08 RX ORDER — SODIUM CHLORIDE 9 MG/ML
3 INJECTION INTRAMUSCULAR; INTRAVENOUS; SUBCUTANEOUS EVERY 8 HOURS
Refills: 0 | Status: DISCONTINUED | OUTPATIENT
Start: 2023-11-08 | End: 2023-11-09

## 2023-11-08 RX ORDER — OXYTOCIN 10 UNIT/ML
2 VIAL (ML) INJECTION
Qty: 30 | Refills: 0 | Status: DISCONTINUED | OUTPATIENT
Start: 2023-11-08 | End: 2023-11-09

## 2023-11-08 RX ORDER — PRAMOXINE HYDROCHLORIDE 150 MG/15G
1 AEROSOL, FOAM RECTAL EVERY 4 HOURS
Refills: 0 | Status: DISCONTINUED | OUTPATIENT
Start: 2023-11-08 | End: 2023-11-09

## 2023-11-08 RX ORDER — TETANUS TOXOID, REDUCED DIPHTHERIA TOXOID AND ACELLULAR PERTUSSIS VACCINE, ADSORBED 5; 2.5; 8; 8; 2.5 [IU]/.5ML; [IU]/.5ML; UG/.5ML; UG/.5ML; UG/.5ML
0.5 SUSPENSION INTRAMUSCULAR ONCE
Refills: 0 | Status: DISCONTINUED | OUTPATIENT
Start: 2023-11-08 | End: 2023-11-09

## 2023-11-08 RX ADMIN — SODIUM CHLORIDE 125 MILLILITER(S): 9 INJECTION, SOLUTION INTRAVENOUS at 21:14

## 2023-11-08 RX ADMIN — Medication 30 MILLIGRAM(S): at 22:22

## 2023-11-08 RX ADMIN — Medication 30 MILLIGRAM(S): at 22:04

## 2023-11-08 RX ADMIN — SODIUM CHLORIDE 3 MILLILITER(S): 9 INJECTION INTRAMUSCULAR; INTRAVENOUS; SUBCUTANEOUS at 23:37

## 2023-11-08 NOTE — OB PROVIDER H&P - HISTORY OF PRESENT ILLNESS
FRIDA CARVER is a 33y  at 39 weeks GA who presents to L&D for elective IOL. GBS negative.  Pt denies vaginal bleeding, contractions and leakage of fluid. She endorses good fetal movement.     Pt denies trauma. Her last cervical exam was in the office 3cm dilated.  Pt denies headaches, visual disturbances, RUQ pain, epigastric pain and new-onset edema.   She denies any urinary complaints.   She denies fevers, chills, nausea, vomiting.   She denies shortness of breath, chest pain, and palpitations.    Pregnancy course is uncomplicated.     POB:  x2  PGYN: +fibroids/-cysts, denied STD hx, denies abnormal PAPs  PMH: asthma  PSH: denies  SH: Denies tobacco use, EtOH use and illicit drug use during the pregnancy; Feels safe at home  Meds: PNVs  All: NKDA

## 2023-11-08 NOTE — OB PROVIDER H&P - NSHPPHYSICALEXAM_GEN_ALL_CORE
VITALS  T(C): 37.1 (11-08-23 @ 14:50), Max: 37.1 (11-08-23 @ 14:50)  HR: 82 (11-08-23 @ 14:50) (82 - 82)  BP: 110/67 (11-08-23 @ 14:50) (110/67 - 110/67)  RR: 18 (11-08-23 @ 14:50) (18 - 18)  SpO2: --    Gen: NAD, well-appearing  Heart: S1 S2, RRR  Lungs: CTAB  Abd: soft, gravid  Ext: non-edematous, non-tender     FHT: 140bpm, +accels, no decels mod variability   [place]: irregularly olu VITALS  T(C): 37.1 (11-08-23 @ 14:50), Max: 37.1 (11-08-23 @ 14:50)  HR: 82 (11-08-23 @ 14:50) (82 - 82)  BP: 110/67 (11-08-23 @ 14:50) (110/67 - 110/67)  RR: 18 (11-08-23 @ 14:50) (18 - 18)    Gen: NAD, well-appearing  Heart: S1 S2, RRR  Lungs: CTAB  Abd: soft, gravid  Ext: non-edematous, non-tender     FHT: 140bpm, +accels, no decels mod variability   [place]: irregularly olu

## 2023-11-08 NOTE — OB PROVIDER H&P - ATTENDING COMMENTS
Pt was scheduled for elective induction of labor due to irreg CTXs all week and cervical change to 3-4cm in the office.  She arrived for induction and C/O irreg CTXs which increased in frequency and severity while waiting to be admitted.  On arrival, cervix 4cm dilated and pt requesting epidural.  Pt in labor on admission.

## 2023-11-08 NOTE — OB PROVIDER H&P - NSLOWPPHRISK_OBGYN_A_OB
No previous uterine incision/Rasmussen Pregnancy/Less than or equal to 4 previous vaginal births/No known bleeding disorder/No history of postpartum hemorrhage/No other PPH risks indicated

## 2023-11-08 NOTE — OB RN DELIVERY SUMMARY - NS_SEPSISRSKCALC_OBGYN_ALL_OB_FT
EOS calculated successfully. EOS Risk Factor: 0.5/1000 live births ([facility] national incidence); GA=39w;Temp=98.8; ROM=1.883; GBS='Negative'; Antibiotics='No antibiotics or any antibiotics < 2 hrs prior to birth'

## 2023-11-08 NOTE — OB PROVIDER DELIVERY SUMMARY - NSPROVIDERDELIVERYNOTE_OBGYN_ALL_OB_FT
Procedure: Normal Vaginal Delivery  Findings: Viable male infant delivered in cephalic presentation at , placenta delivered at . Apgar 9/9.  Laceration: first degree  Procedure: Delivered FORTUNATO, clear fluid. Infant's head delivered with maternal expulsive efforts. Shoulders delivered without difficulty followed by the rest of the body. Nose and mouth were bulb suctioned. Cord clamped and cut after delay. Samples obtained. Baby handed to patient. Placenta delivered spontaneously, intact at . Pitocin started. Excellent hemostasis achieved. Perineum and vagina were inspected, a first laceration was present repaired with chromic suture. Excellent hemostasis obtained. Pt tolerated procedure well, in stable condition, recovering in LDR. Infant in LDR. Instrument/sponge count correct x 2 and confirmed by nurse.  QBL: 92

## 2023-11-08 NOTE — OB PROVIDER H&P - ASSESSMENT
Patient is a 33 y.o.  at 39w gestation admitted to L&D for [facility]. Cat I FHT.   - consent  - admission labs  - IV hydration  - continuous toco and fetal heart monitoring  - GBS neg  - induction method: pitocin  - pain management: Epidural prn     Discussed with Dr. Oliver

## 2023-11-08 NOTE — OB RN DELIVERY SUMMARY - NS_GENERALBABYACOMMENTA_OBGYN_ALL_OB_FT
Mother and baby transferred to mother baby unit accompanied by spouse. Mother's bleeding moderate, fundus firm. No compliants of pain as per mother. Baby vital signs stable.

## 2023-11-08 NOTE — OB RN PATIENT PROFILE - FALL HARM RISK - PATIENT NEEDS ASSISTANCE
Break RN: pt received rm 29,  a&ox4. c/o abscess to left temporal region x5 days associated with pain. Pt states he has had abscesses removed in groin and armpit in the past. No past medical hx. Pt denies cp, sob, n/v, headache, dizziness, vision changes. breathing even, unlabored. Culture taken from abscess by MD Sheldon. Plan to dc home.
No assistance needed

## 2023-11-08 NOTE — OB RN PATIENT PROFILE - BLOOD TRANSFUSION, PREVIOUS, PROFILE
Seen for follow for type 1 diabetes.  HbA1c today is 10.7%.  Target is <7.5%.           Plan  Importance of compliance reinforced   BG monitoring at least 4x/day  Send me numbers rosa week to review for any further insulin dose adjustment            Yearly eye exams are recommended   Dental exams every 6 months are recommended  Flu vaccine is recommended every year, as early in the season as possible  Medical ID should be worn at all times  Continue rotating injection/insertion sites  Annual labs are due: November 2022  RTC in 3 months          New insulin regimen  Lantus: 33units daily in the afternoon    Humalog sliding scale with meals:  100-200   6units  201-300   7units  301-400   8  >400        9units  Continue metformin 1000mg twice daily with meals no

## 2023-11-08 NOTE — OB PROVIDER DELIVERY SUMMARY - NSSELHIDDEN_OBGYN_ALL_OB_FT
[NS_DeliveryAttending1_OBGYN_ALL_OB_FT:[id number]],[NS_DeliveryAssist1_OBGYN_ALL_OB_FT:[id number]]

## 2023-11-08 NOTE — OB PROVIDER DELIVERY SUMMARY - NS_GESTAGEATBIRTHA_OBGYN_ALL_OB_FT
39w 32 y/o M with PMHx HIV (T-cells in the 300s) and history of mental illness presents to the ED with R heel blister that burst a few days ago with concern of infection. Pt also reports having a secondary infection of the anterior part of his foot by his 4th and 5th toes. Pt wants to reaffirm that the infection is resolved. Denies fever, purulent discharge, swelling, redness, numbness or tingling.

## 2023-11-08 NOTE — OB RN DELIVERY SUMMARY - NSSELHIDDEN_OBGYN_ALL_OB_FT
[NS_DeliveryAttending1_OBGYN_ALL_OB_FT:[id number]==],[NS_DeliveryAssist1_OBGYN_ALL_OB_FT:[id number]],[NS_DeliveryRN_OBGYN_ALL_OB_FT:[id number]]

## 2023-11-08 NOTE — OB PROVIDER H&P - NS_TOLAC_OBGYN_ALL_OB_NU
Patient received from CVICU room 2036. Vitals taken. Assessment completed. No distress noted. See doc flowsheet and transfer navigator for details. POC and education reviewed with patient. Call light within reach, and pt educated on its use. Bed in lowest position, and locked. Side rails up x2. Denied further questions or needs at this time. Will continue to monitor. 0

## 2023-11-08 NOTE — OB RN PATIENT PROFILE - FUNCTIONAL ASSESSMENT - DAILY ACTIVITY ASSESSMENT TYPE
Introduction to pt and parent. History obtained. Pt assessment completed, gown to pt. Call light within reach, no additional needs at this time.    
Mesha SAN D/C'd.  Discharge instructions including s/s to return to ED, follow up appointments, hydration importance and Nursemaid's Elbow provided to pt/family.    Parents verbalized understanding with no further questions and concerns.    Copy of discharge provided to pt/family.  Signed copy in chart.     Pt carried out of department by mom; pt in NAD, awake, alert, interactive and age appropriate.         
Admission

## 2023-11-08 NOTE — OB RN PATIENT PROFILE - FALL HARM RISK - UNIVERSAL INTERVENTIONS
Bed in lowest position, wheels locked, appropriate side rails in place/Call bell, personal items and telephone in reach/Instruct patient to call for assistance before getting out of bed or chair/Non-slip footwear when patient is out of bed/[place] to call system/Physically safe environment - no spills, clutter or unnecessary equipment/Purposeful Proactive Rounding/Room/bathroom lighting operational, light cord in reach

## 2023-11-09 LAB
HCT VFR BLD CALC: 27.4 % — LOW (ref 34.5–45)
HCT VFR BLD CALC: 27.4 % — LOW (ref 34.5–45)
HGB BLD-MCNC: 9.6 G/DL — LOW (ref 11.5–15.5)
HGB BLD-MCNC: 9.6 G/DL — LOW (ref 11.5–15.5)

## 2023-11-09 RX ORDER — IBUPROFEN 200 MG
600 TABLET ORAL EVERY 6 HOURS
Refills: 0 | Status: DISCONTINUED | OUTPATIENT
Start: 2023-11-09 | End: 2023-11-09

## 2023-11-09 RX ADMIN — Medication 1 TABLET(S): at 13:06

## 2023-11-09 RX ADMIN — Medication 600 MILLIGRAM(S): at 07:34

## 2023-11-09 RX ADMIN — Medication 600 MILLIGRAM(S): at 18:12

## 2023-11-09 RX ADMIN — Medication 975 MILLIGRAM(S): at 09:42

## 2023-11-09 RX ADMIN — Medication 975 MILLIGRAM(S): at 20:12

## 2023-11-09 RX ADMIN — Medication 600 MILLIGRAM(S): at 13:06

## 2023-11-09 NOTE — DISCHARGE NOTE OB - CARE PROVIDERS DIRECT ADDRESSES
We will call with POSITIVE results as soon as we have them next 3-4 days  Monitor blood pressure 2x/day- follow up with primary doctor if your blood pressure remains elevated  
,DirectAddress_Unknown

## 2023-11-09 NOTE — DISCHARGE NOTE OB - NS MD DC FALL RISK RISK
For information on Fall & Injury Prevention, visit: [email or web address] OR  [email or web address] OR  https://www.cdc.gov/steadi/patient.html

## 2023-11-09 NOTE — PROGRESS NOTE ADULT - SUBJECTIVE AND OBJECTIVE BOX
INTERVAL HPI/OVERNIGHT EVENTS:  33y Female s/p labor epidural, on 11/8/23.    Vital Signs Last 24 Hrs  T(C): 36.5 (09 Nov 2023 04:10), Max: 37.1 (08 Nov 2023 14:50)  T(F): 97.7 (09 Nov 2023 04:10), Max: 98.8 (08 Nov 2023 14:50)  HR: 77 (09 Nov 2023 04:10) (68 - 107)  BP: 108/70 (09 Nov 2023 04:10) (94/57 - 128/81)  BP(mean): --  RR: 16 (09 Nov 2023 04:10) (15 - 18)  SpO2: 97% (09 Nov 2023 04:10) (85% - 100%)    Parameters below as of 09 Nov 2023 04:10  Patient On (Oxygen Delivery Method): room air            Patient's overall anesthesia satisfaction: satisfied    Patient seen and doing well     No headache      No residual numbness or weakness, sensory and motor function intact      No anesthetic complications or complaints noted or reported

## 2023-11-09 NOTE — DISCHARGE NOTE OB - CARE PROVIDER_API CALL
Bridgette Oliver  Obstetrics and Gynecology  [address] [place] [id number]  Phone: [phone]  Fax: [phone]  Follow Up Time:

## 2023-11-09 NOTE — DISCHARGE NOTE OB - PATIENT PORTAL LINK FT
You can access the FollowMyHealth Patient Portal offered by [facility] by registering at the following website: [email or web address]. By joining [employer]’s FollowMyHealth portal, you will also be able to view your health information using other applications (apps) compatible with our system.

## 2023-11-09 NOTE — DISCHARGE NOTE OB - CARE PLAN
1 Principal Discharge DX:	Delivery normal  Assessment and plan of treatment:	1) Please take ibuprofen and/or Tylenol as needed for pain as prescribed.  2) Nothing in the vagina for 6 weeks (including no sex, no tampons, and no douching).  3) Please call your doctor for a follow up your postpartum appointment in 4-6 weeks.  4) Please continue taking vitamins postpartum.   5) Please call the office sooner if you have heavy vaginal bleeding, severe abdominal pain, or fever > 100.4F.  6) You may resume regular daily activity as tolerated

## 2023-11-10 VITALS — SYSTOLIC BLOOD PRESSURE: 115 MMHG | HEART RATE: 68 BPM | DIASTOLIC BLOOD PRESSURE: 58 MMHG

## 2024-08-30 PROBLEM — J45.909 UNSPECIFIED ASTHMA, UNCOMPLICATED: Chronic | Status: ACTIVE | Noted: 2022-10-12

## 2024-09-09 ENCOUNTER — APPOINTMENT (OUTPATIENT)
Dept: DERMATOLOGY | Facility: CLINIC | Age: 34
End: 2024-09-09

## 2024-10-08 NOTE — OB PROVIDER H&P - NS_GESTAGE_OBGYN_ALL_OB_FT
Formerly McLeod Medical Center - Seacoast)    [facility]Quinn Sudha K, [facility]    [facility] Visit    12 months ago MIGUELINA (obstructive sleep apnea)    [facility]    Office Visit    1 year ago Essential hypertension    [facility]    Office Visit          Future Appointments         Provider Department Appt Notes    In 1 week Stacey Juarez MD [facility],  [facility] Follow up on Diabetes                          39w1h

## 2024-10-16 ENCOUNTER — INPATIENT (INPATIENT)
Facility: HOSPITAL | Age: 34
LOS: 0 days | Discharge: HOME | End: 2024-10-17
Payer: COMMERCIAL

## 2024-10-16 VITALS
RESPIRATION RATE: 20 BRPM | TEMPERATURE: 98 F | DIASTOLIC BLOOD PRESSURE: 76 MMHG | SYSTOLIC BLOOD PRESSURE: 117 MMHG | HEART RATE: 95 BPM

## 2024-10-16 DIAGNOSIS — O26.893 OTHER SPECIFIED PREGNANCY RELATED CONDITIONS, THIRD TRIMESTER: ICD-10-CM

## 2024-10-16 DIAGNOSIS — O26.899 OTHER SPECIFIED PREGNANCY RELATED CONDITIONS, UNSPECIFIED TRIMESTER: ICD-10-CM

## 2024-10-16 LAB
BASOPHILS # BLD AUTO: 0.05 K/UL — SIGNIFICANT CHANGE UP (ref 0–0.2)
BASOPHILS NFR BLD AUTO: 0.7 % — SIGNIFICANT CHANGE UP (ref 0–2)
BLD GP AB SCN SERPL QL: SIGNIFICANT CHANGE UP
EOSINOPHIL # BLD AUTO: 0.21 K/UL — SIGNIFICANT CHANGE UP (ref 0–0.5)
EOSINOPHIL NFR BLD AUTO: 3 % — SIGNIFICANT CHANGE UP (ref 0–6)
HCT VFR BLD CALC: 32.1 % — LOW (ref 34.5–45)
HGB BLD-MCNC: 10.7 G/DL — LOW (ref 11.5–15.5)
IMM GRANULOCYTES NFR BLD AUTO: 0.7 % — SIGNIFICANT CHANGE UP (ref 0–0.9)
LYMPHOCYTES # BLD AUTO: 1.85 K/UL — SIGNIFICANT CHANGE UP (ref 1–3.3)
LYMPHOCYTES # BLD AUTO: 26.1 % — SIGNIFICANT CHANGE UP (ref 13–44)
MCHC RBC-ENTMCNC: 29.6 PG — SIGNIFICANT CHANGE UP (ref 27–34)
MCHC RBC-ENTMCNC: 33.3 GM/DL — SIGNIFICANT CHANGE UP (ref 32–36)
MCV RBC AUTO: 88.9 FL — SIGNIFICANT CHANGE UP (ref 80–100)
MONOCYTES # BLD AUTO: 0.55 K/UL — SIGNIFICANT CHANGE UP (ref 0–0.9)
MONOCYTES NFR BLD AUTO: 7.8 % — SIGNIFICANT CHANGE UP (ref 2–14)
NEUTROPHILS # BLD AUTO: 4.38 K/UL — SIGNIFICANT CHANGE UP (ref 1.8–7.4)
NEUTROPHILS NFR BLD AUTO: 61.7 % — SIGNIFICANT CHANGE UP (ref 43–77)
PLATELET # BLD AUTO: 181 K/UL — SIGNIFICANT CHANGE UP (ref 150–400)
RBC # BLD: 3.61 M/UL — LOW (ref 3.8–5.2)
RBC # FLD: 13.3 % — SIGNIFICANT CHANGE UP (ref 10.3–14.5)
WBC # BLD: 7.09 K/UL — SIGNIFICANT CHANGE UP (ref 3.8–10.5)
WBC # FLD AUTO: 7.09 K/UL — SIGNIFICANT CHANGE UP (ref 3.8–10.5)

## 2024-10-16 RX ORDER — SODIUM CITRATE AND CITRIC ACID MONOHYDRATE 334; 500 MG/5ML; MG/5ML
30 SOLUTION ORAL ONCE
Refills: 0 | Status: DISCONTINUED | OUTPATIENT
Start: 2024-10-16 | End: 2024-10-16

## 2024-10-16 RX ORDER — OXYTOCIN/RINGER'S LACTATE 20/500ML
167 PLASTIC BAG, INJECTION (ML) INTRAVENOUS
Qty: 30 | Refills: 0 | Status: DISCONTINUED | OUTPATIENT
Start: 2024-10-16 | End: 2024-10-16

## 2024-10-16 RX ORDER — PRAMOXINE HYDROCHLORIDE 10 MG/ML
1 LOTION TOPICAL EVERY 4 HOURS
Refills: 0 | Status: DISCONTINUED | OUTPATIENT
Start: 2024-10-16 | End: 2024-10-17

## 2024-10-16 RX ORDER — ACETAMINOPHEN 325 MG
975 TABLET ORAL
Refills: 0 | Status: DISCONTINUED | OUTPATIENT
Start: 2024-10-16 | End: 2024-10-17

## 2024-10-16 RX ORDER — ANTI-ITCH CREAM 1 G/100G
1 OINTMENT TOPICAL EVERY 6 HOURS
Refills: 0 | Status: DISCONTINUED | OUTPATIENT
Start: 2024-10-16 | End: 2024-10-17

## 2024-10-16 RX ORDER — TETANUS TOXOID, REDUCED DIPHTHERIA TOXOID AND ACELLULAR PERTUSSIS VACCINE, ADSORBED 5; 2.5; 8; 8; 2.5 [IU]/.5ML; [IU]/.5ML; UG/.5ML; UG/.5ML; UG/.5ML
0.5 SUSPENSION INTRAMUSCULAR ONCE
Refills: 0 | Status: DISCONTINUED | OUTPATIENT
Start: 2024-10-16 | End: 2024-10-17

## 2024-10-16 RX ORDER — MAGNESIUM HYDROXIDE 400 MG/5ML
30 SUSPENSION, ORAL (FINAL DOSE FORM) ORAL
Refills: 0 | Status: DISCONTINUED | OUTPATIENT
Start: 2024-10-16 | End: 2024-10-17

## 2024-10-16 RX ORDER — OXYCODONE HYDROCHLORIDE 30 MG/1
5 TABLET, FILM COATED, EXTENDED RELEASE ORAL
Refills: 0 | Status: DISCONTINUED | OUTPATIENT
Start: 2024-10-16 | End: 2024-10-17

## 2024-10-16 RX ORDER — OXYTOCIN/RINGER'S LACTATE 20/500ML
167 PLASTIC BAG, INJECTION (ML) INTRAVENOUS
Qty: 30 | Refills: 0 | Status: DISCONTINUED | OUTPATIENT
Start: 2024-10-16 | End: 2024-10-17

## 2024-10-16 RX ORDER — PRENATAL VIT,CAL 76/IRON/FOLIC 29 MG-1 MG
1 TABLET ORAL DAILY
Refills: 0 | Status: DISCONTINUED | OUTPATIENT
Start: 2024-10-16 | End: 2024-10-17

## 2024-10-16 RX ORDER — DIPHENHYDRAMINE HCL 12.5MG/5ML
25 LIQUID (ML) ORAL EVERY 6 HOURS
Refills: 0 | Status: DISCONTINUED | OUTPATIENT
Start: 2024-10-16 | End: 2024-10-17

## 2024-10-16 RX ORDER — CHLORHEXIDINE GLUCONATE ORAL RINSE 1.2 MG/ML
1 SOLUTION DENTAL DAILY
Refills: 0 | Status: DISCONTINUED | OUTPATIENT
Start: 2024-10-16 | End: 2024-10-16

## 2024-10-16 RX ORDER — SOAP/LANOLIN
1 BAR TOPICAL EVERY 4 HOURS
Refills: 0 | Status: DISCONTINUED | OUTPATIENT
Start: 2024-10-16 | End: 2024-10-17

## 2024-10-16 RX ORDER — KETOROLAC TROMETHAMINE 10 MG/1
30 TABLET, FILM COATED ORAL ONCE
Refills: 0 | Status: DISCONTINUED | OUTPATIENT
Start: 2024-10-16 | End: 2024-10-16

## 2024-10-16 RX ORDER — SODIUM CHLORIDE 0.9 % (FLUSH) 0.9 %
3 SYRINGE (ML) INJECTION EVERY 8 HOURS
Refills: 0 | Status: DISCONTINUED | OUTPATIENT
Start: 2024-10-16 | End: 2024-10-17

## 2024-10-16 RX ORDER — SODIUM CHLORIDE IRRIG SOLUTION 0.9 %
1000 SOLUTION, IRRIGATION IRRIGATION
Refills: 0 | Status: DISCONTINUED | OUTPATIENT
Start: 2024-10-16 | End: 2024-10-16

## 2024-10-16 RX ORDER — DIBUCAINE 1 %
1 OINTMENT (GRAM) TOPICAL EVERY 6 HOURS
Refills: 0 | Status: DISCONTINUED | OUTPATIENT
Start: 2024-10-16 | End: 2024-10-17

## 2024-10-16 RX ORDER — OXYCODONE HYDROCHLORIDE 30 MG/1
5 TABLET, FILM COATED, EXTENDED RELEASE ORAL ONCE
Refills: 0 | Status: DISCONTINUED | OUTPATIENT
Start: 2024-10-16 | End: 2024-10-17

## 2024-10-16 RX ADMIN — Medication 125 MILLILITER(S): at 09:54

## 2024-10-16 RX ADMIN — Medication 975 MILLIGRAM(S): at 18:25

## 2024-10-16 RX ADMIN — Medication 125 MILLILITER(S): at 09:55

## 2024-10-16 RX ADMIN — KETOROLAC TROMETHAMINE 30 MILLIGRAM(S): 10 TABLET, FILM COATED ORAL at 14:52

## 2024-10-16 NOTE — OB PROVIDER H&P - NSHPPHYSICALEXAM_GEN_ALL_CORE
HR: 95 (10-16-24 @ 07:32) (95 - 95)  BP: 117/76 (10-16-24 @ 07:32) (117/76 - 117/76)  RR: 15  SpO2: 99    Gen: NAD, well-appearing, AAOx3   Abd: Soft, gravid  Ext: non-tender, non-edematous  SVE:  4/60/-1  Bedside sono: VTX   FHT: 140bpm, moderate variability, accels, no decels   [place]: q3-4

## 2024-10-16 NOTE — OB PROVIDER DELIVERY SUMMARY - NSSELHIDDEN_OBGYN_ALL_OB_FT
[NS_DeliveryAttending1_OBGYN_ALL_OB_FT:[id number]==],[NS_DeliveryRN_OBGYN_ALL_OB_FT:[id number]]

## 2024-10-16 NOTE — OB PROVIDER H&P - NSRUBEOLADATE_OBGYN_ALL_OB
Quality 226: Preventive Care And Screening: Tobacco Use: Screening And Cessation Intervention: Patient screened for tobacco use and is an ex/non-smoker
Detail Level: Detailed
Quality 402: Tobacco Use And Help With Quitting Among Adolescents: Patient screened for tobacco and never smoked
10-May-2024

## 2024-10-16 NOTE — OB NEONATOLOGY/PEDIATRICIAN DELIVERY SUMMARY - NSPEDSNEONOTESA_OBGYN_ALL_OB_FT
Requested by DR Oliver to evalute a FT BB born to a 34y/o  mother at 38.2 weeks GA secondary to a rash on his body.  She had + PNC, is blood type....., HIV neg, HBsAg neg, RPR NR, Rubella Imm, GBS..., COVID19...  L&D:  ROM .....  Baby born .... with good cry, transferred to warmer, orally suctioned, dried, and examined. Infant showed to father and then transferred to mother for STS.  A/P:  FT male with congenital rash, R/O HSV Requested by DR Oliver to evaluate a FT BB born to a 34y/o  mother at 38.2 weeks GA secondary to a rash on his body.  She had + PNC, is blood type O pos, HIV neg, HBsAg neg, Syphilis neg, Rubella Non Imm, GBS neg, Hep C NR, hx of skin rash during pregnancy treated with steroids by dermatologist, hx of cold sores,   L&D:  AROM aprox 1.5 hrs PTD with clear fluids.  Baby born vertex with CAN, good cry.  APGAR Score of 9/9 given by nurse.  I was called to evaluate rash on baby noted at birth.  Infant examined.   Baby with pustular rash some with erythematous bases and some with scabs (mostly on face), also some lesions noted on the rest of the body.  No hx of Genital HSV in mother, no lesions noted at delivery. Although unlikely based on maternal history and physical exam, congenital HSV infection can not be ruled out by physical examination. Parents explained about concerns for possible HSV infection in the baby and the need for further evaluation and management. Infant showed to father and then transferred to mother for STS prior to transfer to NICU for further evaluation and management.   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

## 2024-10-16 NOTE — OB RN DELIVERY SUMMARY - NS_SEPSISRSKCALC_OBGYN_ALL_OB_FT
EOS calculated successfully. EOS Risk Factor: 0.5/1000 live births ([facility] national incidence); GA=38w2d; Temp=97.7; ROM=1.417; GBS='Negative'; Antibiotics='No antibiotics or any antibiotics < 2 hrs prior to birth'

## 2024-10-16 NOTE — OB NEONATOLOGY/PEDIATRICIAN DELIVERY SUMMARY - NSPHYSICALEXAMDETAILSA_OBGYN_ALL_OB_FT
+ pustules with erythematous base, some with scabs (mostly concentrated on face), some spread on body

## 2024-10-16 NOTE — OB RN DELIVERY SUMMARY - NSSELHIDDEN_OBGYN_ALL_OB_FT
[NS_DeliveryAttending1_OBGYN_ALL_OB_FT:[id number]==],[NS_DeliveryRN_OBGYN_ALL_OB_FT:[id number]=]

## 2024-10-16 NOTE — OB PROVIDER H&P - NSLOWPPHRISK_OBGYN_A_OB
No previous uterine incision/[place] Pregnancy/Less than or equal to 4 previous vaginal births/No known bleeding disorder/No history of postpartum hemorrhage

## 2024-10-16 NOTE — OB PROVIDER H&P - ASSESSMENT
A/P: 34y  at 40weeks GA for admission secondary to Labor.     -Admit to L&D  -Consent  -Admission labs  -NPO   -IV fluids  -Labor: Intact. Latent labor. Jacob q3-4.   -Fetus: Cat I tracing. Continuous toco and fetal monitoring.   -GBS: Negative, no GBS ppx required   -Analgesia: requesting epidural.     Discussed with Dr. Oliver

## 2024-10-16 NOTE — OB PROVIDER H&P - NSICDXPASTMEDICALHX_GEN_ALL_CORE_FT
PAST MEDICAL HISTORY:  Asthma     Asthma     Bulimia      (normal spontaneous vaginal delivery)

## 2024-10-16 NOTE — OB PROVIDER H&P - ATTENDING COMMENTS
34 yr old  at 38+ wks admitted in labor.  Cervix 4cm dilated, CTXs regular.   Cat 1 FHR tracing.  Prenatal course uncomplicated.  Plan for admission, epidural for pain.

## 2024-10-16 NOTE — OB PROVIDER H&P - HISTORY OF PRESENT ILLNESS
34y  at 40weeks GA by LMP consistent with  who presents to L&D with intermittent abdominal pain, roughly of 5/10 severity, that started roughly 6 hours ago. Pain dissipated after patient had a hot shower but re-started again this am. Patient denies vaginal bleeding and leakage of fluid. She endorses good fetal movement. Denies fevers, chills, nausea, vomiting, chest pain, SOB, dizziness and headache. No other complaints at this time.     VISHAL: 10/28/2024  LMP: 2024    Prenatal course is significant for: short intergestational period   POB:  ;  G1: 2017 @ 40w, 16 hrs of labor +epi  G2: 10/12/2022 @39w, 8hrs of labor +epi  G3: 2023 @39w6d, 6hrs of labor +epi  PGYN: -fibroids, -ovarian cysts, denies STD hx, h/o abn PAP - ASC-US -> last one in 2023. Has since reverted back to normal.   PMH: Asthma, required hospitalization and nebulized therapy. Patient has not had any episodes since her first pregnancy in 2017. No inhaler therapy since then.  PSH: Denies  SH: Denies EtOH, tobacco and illicit drug use during this pregnancy; feels safe at home   Meds: PNVs  Allergies: NKDA

## 2024-10-16 NOTE — OB RN PATIENT PROFILE - FALL HARM RISK - UNIVERSAL INTERVENTIONS
Bed in lowest position, wheels locked, appropriate side rails in place/Call bell, personal items and telephone in reach/Instruct patient to call for assistance before getting out of bed or chair/Non-slip footwear when patient is out of bed/[place] to call system/Physically safe environment - no spills, clutter or unnecessary equipment/Purposeful Proactive Rounding/Room/bathroom lighting operational, light cord in reach

## 2024-10-16 NOTE — OB PROVIDER DELIVERY SUMMARY - NSPROVIDERDELIVERYNOTE_OBGYN_ALL_OB_FT
Pt feeling pressure and urge to push.  On exam, cervix FD/100%/+1 station.  Arellano removed and pt prepped for delivery.  After pushing <10min, head delivered FORTUNATO.  Very tight nuchal cord x 1 clamped at the perineum x 2 and cut.  Body spontaneously delivered.  Nose and mouth bulb suctioned and baby placed on mother for skin-to-skin.  Segment of cord clamped and cut for umbilical cord blood gases and G6PD testing.  Placenta delivered intact, 3 vessel cord.  No gross pathology.  Perineum intact, no lacerations.  [place]: male, weight 2890g, 6lb 5oz, APGAR 9/9.    Diffuse rash, worst over face, scalp,  and upper torso - neonatologist notified.  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  Plan reviewed  with pt and her  at length.

## 2024-10-16 NOTE — OB NEONATOLOGY/PEDIATRICIAN DELIVERY SUMMARY - BABY A: APGAR 5 MIN REFLEX IRRITABILITY, DELIVERY
Telephone Encounter by Winter Garrison at 03/31/17 04:19 PM     Author:  Winter Garrison Service:  (none) Author Type:       Filed:  03/31/17 04:20 PM Encounter Date:  3/31/2017 Status:  Signed     :  Winter Garrison (Patient )            Called and was able to speak to patient. Patient declined making an appointment at this time stating that she needed to call Dr. Sandoval's nurse before scheduling([place]). I provided patient with phone# of [phone] to call back and schedule. Patient verbalized understanding and call was ended.     When calling back, please schedule patient as an NPT for Neurology[CV1.1M]      Revision History        User Key Date/Time User Provider Type Action    > CV1.1 03/31/17 04:20 PM Winter Garrison Patient  Sign    M - Manual            
(2) cough or sneeze

## 2024-10-17 ENCOUNTER — TRANSCRIPTION ENCOUNTER (OUTPATIENT)
Age: 34
End: 2024-10-17

## 2024-10-17 LAB
HCT VFR BLD CALC: 27.4 % — LOW (ref 34.5–45)
HGB BLD-MCNC: 9.3 G/DL — LOW (ref 11.5–15.5)
HSV 1/2 SOURCE: SIGNIFICANT CHANGE UP
HSV1 DNA BLD QL NAA+PROBE: SIGNIFICANT CHANGE UP
HSV1 IGG SER-ACNC: 10.4 INDEX — HIGH
HSV1 IGG SERPL QL IA: POSITIVE
HSV2 DNA BLD QL NAA+PROBE: SIGNIFICANT CHANGE UP
HSV2 IGG FLD-ACNC: 0.03 INDEX — SIGNIFICANT CHANGE UP
HSV2 IGG SERPL QL IA: NEGATIVE — SIGNIFICANT CHANGE UP
T PALLIDUM AB TITR SER: NEGATIVE — SIGNIFICANT CHANGE UP

## 2024-10-17 PROCEDURE — 85018 HEMOGLOBIN: CPT

## 2024-10-17 PROCEDURE — 86850 RBC ANTIBODY SCREEN: CPT

## 2024-10-17 PROCEDURE — 86901 BLOOD TYPING SEROLOGIC RH(D): CPT

## 2024-10-17 PROCEDURE — 86695 HERPES SIMPLEX TYPE 1 TEST: CPT

## 2024-10-17 PROCEDURE — 59050 FETAL MONITOR W/REPORT: CPT

## 2024-10-17 PROCEDURE — 87529 HSV DNA AMP PROBE: CPT

## 2024-10-17 PROCEDURE — 85014 HEMATOCRIT: CPT

## 2024-10-17 PROCEDURE — 36415 COLL VENOUS BLD VENIPUNCTURE: CPT

## 2024-10-17 PROCEDURE — 85025 COMPLETE CBC W/AUTO DIFF WBC: CPT

## 2024-10-17 PROCEDURE — 86696 HERPES SIMPLEX TYPE 2 TEST: CPT

## 2024-10-17 PROCEDURE — 86780 TREPONEMA PALLIDUM: CPT

## 2024-10-17 PROCEDURE — 86900 BLOOD TYPING SEROLOGIC ABO: CPT

## 2024-10-17 RX ORDER — PRENATAL VIT,CAL 76/IRON/FOLIC 29 MG-1 MG
1 TABLET ORAL
Refills: 0 | DISCHARGE

## 2024-10-17 RX ORDER — ACETAMINOPHEN 325 MG
2 TABLET ORAL
Qty: 56 | Refills: 0
Start: 2024-10-17 | End: 2024-10-23

## 2024-10-17 RX ADMIN — Medication 600 MILLIGRAM(S): at 11:49

## 2024-10-17 RX ADMIN — Medication 975 MILLIGRAM(S): at 16:06

## 2024-10-17 RX ADMIN — Medication 600 MILLIGRAM(S): at 17:34

## 2024-10-17 RX ADMIN — Medication 1 TABLET(S): at 16:07

## 2024-10-17 RX ADMIN — Medication 975 MILLIGRAM(S): at 10:07

## 2024-10-17 RX ADMIN — Medication 600 MILLIGRAM(S): at 01:10

## 2024-10-17 RX ADMIN — Medication 975 MILLIGRAM(S): at 04:13

## 2024-10-17 NOTE — DISCHARGE NOTE OB - PATIENT PORTAL LINK FT
You can access the FollowMyHealth Patient Portal offered by [facility] by registering at the following website: [email or web address]. By joining [employer]’s FollowMyHealth portal, you will also be able to view your health information using other applications (apps) compatible with our system.

## 2024-10-17 NOTE — DISCHARGE NOTE OB - CARE PROVIDER_API CALL
Bridgette Oliver  Obstetrics and Gynecology  [address] [place] [id number]  Phone: [phone]  Fax: [phone]  Follow Up Time: 1 month

## 2024-10-17 NOTE — PROGRESS NOTE ADULT - SUBJECTIVE AND OBJECTIVE BOX
, PPD#1     Pt is now PPD#1 S/P  at 38+ wks doing well.    She is ambulating without difficulty, tolerating a reg diet, and denies N/V.  No C/O dizziness, no MIRNA.  +Voiding without difficulty.      On exam, pt appears well.  VSS, afebrile.    Vital Signs Last 24 Hrs  T(C): 36.3 (17 Oct 2024 04:45), Max: 37.2 (16 Oct 2024 15:30)  T(F): 97.3 (17 Oct 2024 04:45), Max: 98.9 (16 Oct 2024 15:30)  HR: 68 (17 Oct 2024 04:45) (57 - 104)  BP: 126/80 (17 Oct 2024 04:45) (102/56 - 132/63)  RR: 18 (17 Oct 2024 04:45) (14 - 20)  SpO2: 96% (17 Oct 2024 04:45) (86% - 100%)      Breasts soft, nontender, nonengorged.  Abdomen: Soft, nontender, nondistended , firm uterine fundus.  Pelvic: Normal lochia noted  Ext: No DVT tenderness, normal pulses, edema WNL for PPD#1.    LABS:                        10.7   7.09  )-----------( 181      ( 16 Oct 2024 08:13 )             32.1             PPD#1 S/P  stable.  Pt recovering well.  Signs and symptoms normal /  abnormal post partum courses reviewed.  [place] doing well in NICU - HSV PCR resulted, NEGATIVE.  Pt is hopeful for D/C home later today.  Will discuss with NICU.  Will F/U her HSV PCR as well and plan for circ prior to discharge.        
INTERVAL HPI/OVERNIGHT EVENTS:  34y Female s/p labor epidural on 10/16/24    Vital Signs Last 24 Hrs  T(C): 36.9 (17 Oct 2024 09:48), Max: 37.2 (16 Oct 2024 15:30)  T(F): 98.5 (17 Oct 2024 09:48), Max: 98.9 (16 Oct 2024 15:30)  HR: 66 (17 Oct 2024 09:48) (66 - 89)  BP: 122/77 (17 Oct 2024 09:48) (107/55 - 126/80)  BP(mean): --  RR: 18 (17 Oct 2024 09:48) (14 - 18)  SpO2: 99% (17 Oct 2024 09:48) (96% - 99%)    Parameters below as of 17 Oct 2024 09:48  Patient On (Oxygen Delivery Method): room air            Patient's overall anesthesia satisfaction: satisfied    Patient seen and doing well     No headache      No residual numbness or weakness, sensory and motor function intact      No anesthetic complications or complaints noted or reported

## 2024-10-17 NOTE — DISCHARGE NOTE OB - FINANCIAL ASSISTANCE
[facility] provides services at a reduced cost to those who are determined to be eligible through [facility]’s financial assistance program. Information regarding [facility]’s financial assistance program can be found by going to [email or web address] or by calling [phone].

## 2024-10-18 VITALS
RESPIRATION RATE: 18 BRPM | SYSTOLIC BLOOD PRESSURE: 122 MMHG | DIASTOLIC BLOOD PRESSURE: 79 MMHG | TEMPERATURE: 98 F | OXYGEN SATURATION: 98 % | HEART RATE: 70 BPM